# Patient Record
Sex: MALE | Race: WHITE | NOT HISPANIC OR LATINO | URBAN - METROPOLITAN AREA
[De-identification: names, ages, dates, MRNs, and addresses within clinical notes are randomized per-mention and may not be internally consistent; named-entity substitution may affect disease eponyms.]

---

## 2017-09-26 ENCOUNTER — OFFICE VISIT - RIVER FALLS (OUTPATIENT)
Dept: FAMILY MEDICINE | Facility: CLINIC | Age: 67
End: 2017-09-26

## 2017-09-26 ASSESSMENT — MIFFLIN-ST. JEOR: SCORE: 1331.45

## 2017-10-25 ENCOUNTER — RECORDS - HEALTHEAST (OUTPATIENT)
Dept: ADMINISTRATIVE | Facility: OTHER | Age: 67
End: 2017-10-25

## 2017-11-02 ENCOUNTER — OFFICE VISIT - RIVER FALLS (OUTPATIENT)
Dept: FAMILY MEDICINE | Facility: CLINIC | Age: 67
End: 2017-11-02

## 2017-11-02 ASSESSMENT — MIFFLIN-ST. JEOR: SCORE: 1298.79

## 2017-11-03 ENCOUNTER — DOCUMENTATION ONLY (OUTPATIENT)
Dept: VASCULAR SURGERY | Facility: CLINIC | Age: 67
End: 2017-11-03

## 2017-11-03 DIAGNOSIS — Z98.890 HX OF REPAIR OF DISSECTING THORACIC AORTIC ANEURYSM, STANFORD TYPE A: Primary | ICD-10-CM

## 2017-11-03 DIAGNOSIS — Z86.79 HX OF REPAIR OF DISSECTING THORACIC AORTIC ANEURYSM, STANFORD TYPE A: Primary | ICD-10-CM

## 2017-11-03 LAB
CREAT SERPL-MCNC: 2.09 MG/DL (ref 0.7–1.25)
GLUCOSE BLD-MCNC: 99 MG/DL (ref 65–99)

## 2017-11-21 ENCOUNTER — RECORDS - HEALTHEAST (OUTPATIENT)
Dept: ADMINISTRATIVE | Facility: OTHER | Age: 67
End: 2017-11-21

## 2017-11-21 ENCOUNTER — AMBULATORY - HEALTHEAST (OUTPATIENT)
Dept: CARDIOLOGY | Facility: CLINIC | Age: 67
End: 2017-11-21

## 2017-11-24 ENCOUNTER — PRE VISIT (OUTPATIENT)
Dept: CARDIOLOGY | Facility: CLINIC | Age: 67
End: 2017-11-24

## 2017-11-24 DIAGNOSIS — I71.00 AORTIC DISSECTION (H): Primary | ICD-10-CM

## 2017-11-24 NOTE — TELEPHONE ENCOUNTER
EKG : 11/28/17  Prior to appt    Echo : 10/25/17  Frequent PAC's and PVC's.   There appears to be a probable aortic dissection in the abdominal aorta (image 94).   I was notified of this by the sonographer.   She will contact the referring MD or his coverage; clinical correlation, and likely dedicated ultrasound of the abdominal aorta, or CT  scanning will be considered. Please note, this finding appears on the previous study.   Normal left ventricular ejection fraction estimated at 55-60%.   Mild-to-moderate aortic regurgitation.   No pericardial effusion.   Estimated EF: 55-60%    CT chest/abdomen/pelvis : 10/25/17  1.  Aortic dissection extending from distal to the origin of the left subclavian artery into the left common iliac artery.  2.  Additional dissection flap in the innominate artery extends into the right common carotid artery. The cephalad extent of the common carotid involvement appears to be included in the image field of view. Further evaluation by carotid ultrasound is recommended to assess for possible more cephalad involvement of the carotid arteries.  3.  Bovine anatomy of the aortic arch.  4.  Left kidney chronically atrophic with diminished perfusion of the upper pole and interpolar region. Left renal artery arises from the false lumen and is small in size but does appear to opacify.  5.  Cholelithiasis.  6.  Colonic diverticulosis.  7.  Mild thickening of the adrenal gland suggests hyperplasia.

## 2017-11-28 ENCOUNTER — COMMUNICATION - HEALTHEAST (OUTPATIENT)
Dept: TELEHEALTH | Facility: CLINIC | Age: 67
End: 2017-11-28

## 2017-11-28 ENCOUNTER — OFFICE VISIT (OUTPATIENT)
Dept: CARDIOLOGY | Facility: CLINIC | Age: 67
End: 2017-11-28
Attending: INTERNAL MEDICINE
Payer: MEDICARE

## 2017-11-28 VITALS
HEIGHT: 70 IN | DIASTOLIC BLOOD PRESSURE: 66 MMHG | HEART RATE: 92 BPM | SYSTOLIC BLOOD PRESSURE: 170 MMHG | OXYGEN SATURATION: 98 %

## 2017-11-28 DIAGNOSIS — I71.03 DISSECTION OF THORACOABDOMINAL AORTA (H): ICD-10-CM

## 2017-11-28 DIAGNOSIS — I77.71 CAROTID DISSECTION, BILATERAL (H): Primary | ICD-10-CM

## 2017-11-28 DIAGNOSIS — I77.72 DISSECTION OF LEFT ILIAC ARTERY (H): ICD-10-CM

## 2017-11-28 PROBLEM — K57.30 DIVERTICULOSIS OF LARGE INTESTINE: Status: ACTIVE | Noted: 2017-11-28

## 2017-11-28 PROBLEM — I71.010 DISSECTING ANEURYSM OF THORACIC AORTA, STANFORD TYPE A (H): Status: ACTIVE | Noted: 2017-11-28

## 2017-11-28 PROBLEM — N26.1 ATROPHY OF LEFT KIDNEY: Status: ACTIVE | Noted: 2017-11-28

## 2017-11-28 PROCEDURE — 93005 ELECTROCARDIOGRAM TRACING: CPT | Mod: ZF

## 2017-11-28 PROCEDURE — 93010 ELECTROCARDIOGRAM REPORT: CPT | Mod: ZP | Performed by: INTERNAL MEDICINE

## 2017-11-28 PROCEDURE — 99213 OFFICE O/P EST LOW 20 MIN: CPT | Mod: 25,ZF

## 2017-11-28 PROCEDURE — 99205 OFFICE O/P NEW HI 60 MIN: CPT | Mod: ZP | Performed by: INTERNAL MEDICINE

## 2017-11-28 RX ORDER — MULTIVIT-MIN/IRON/FOLIC ACID/K 18-600-40
CAPSULE ORAL
COMMUNITY

## 2017-11-28 RX ORDER — METOPROLOL SUCCINATE 50 MG/1
50 TABLET, EXTENDED RELEASE ORAL
COMMUNITY
Start: 2017-10-27

## 2017-11-28 RX ORDER — SIMVASTATIN 40 MG
40 TABLET ORAL
COMMUNITY

## 2017-11-28 RX ORDER — ASPIRIN 81 MG/1
81 TABLET, CHEWABLE ORAL
COMMUNITY

## 2017-11-28 RX ORDER — TRIAMTERENE AND HYDROCHLOROTHIAZIDE 75; 50 MG/1; MG/1
1 TABLET ORAL
COMMUNITY

## 2017-11-28 ASSESSMENT — PAIN SCALES - GENERAL: PAINLEVEL: NO PAIN (0)

## 2017-11-28 NOTE — PATIENT INSTRUCTIONS
You were seen today in the Cardiovascular Clinic at the [facility].      Cardiology Providers you saw during your visit:  Dr. Curiel    Diagnosis:  Aortic dissection/ carotid dissection/ iliac dissection    Results:  Outside records reviewed.  EKG reviewed in clinic.    Recommendations:   Carotid duplex.  Schedule in the next 1-2 weeks     Lower extremity arterial duplex.  Schedule in next 1-2 weeks.     Continue home blood pressures.  Goal < 130/90    Follow-up:  6 months with Dr. Curile.      For emergencies call [phone].    For any scheduling needs, please call [phone]. Option 1 then option 3    Thank you for your visit today!     Please call if you have any questions or concerns.  Enoc Ayon RN

## 2017-11-28 NOTE — NURSING NOTE
EKG:  Patient given results of EKG done in clinic.  Patient demonstrated understanding of this information and agreed to call with further questions or concerns.  Vascular Testing: Patient given instructions regarding carotid duplex and lower extremity duplex. Discussed purpose, preparation, procedure and when to expect results reported back to the patient. Patient demonstrated understanding of this information and agreed to call with further questions or concerns.  Med Reconcile: Reviewed and verified all current medications with the patient. The updated medication list was printed and given to the patient.  Return Appointment: 6 months with Dr. Curiel.  Patient given instructions regarding scheduling next clinic visit. Patient demonstrated understanding of this information and agreed to call with further questions or concerns.  Patient stated he understood all health information given and agreed to call with further questions or concerns.

## 2017-11-28 NOTE — MR AVS SNAPSHOT
After Visit Summary   11/28/2017    Jose Fierro    MRN: [id number]           Patient Information     Date Of Birth          1950        Visit Information        Provider Department      11/28/2017 11:30 AM Breanne Curiel MD [facility] CARDIOVASCULAR      Today's Diagnoses     Carotid dissection, right    -  1    Aortic dissection (H)        Dissection of thoracoabdominal aorta (H)        Dissection of left iliac artery (H)          Care Instructions    You were seen today in the Cardiovascular Clinic at the [facility].      Cardiology Providers you saw during your visit:  Dr. Curiel    Diagnosis:  Aortic dissection/ carotid dissection/ iliac dissection    Results:  Outside records reviewed.  EKG reviewed in clinic.    Recommendations:   Carotid duplex.  Schedule in the next 1-2 weeks     Lower extremity arterial duplex.  Schedule in next 1-2 weeks.     Continue home blood pressures.  Goal < 130/90    Follow-up:  6 months with Dr. Curiel.      For emergencies call 911.    For any scheduling needs, please call [phone]. Option 1 then option 3    Thank you for your visit today!     Please call if you have any questions or concerns.  Enoc Ayon RN              Follow-ups after your visit        Follow-up notes from your care team     See patient instructions section of the AVS Return in about 6 months (around 5/28/2018) for Routine Visit.      Your next 10 appointments already scheduled     Dec 14, 2017 12:00 PM CST   US CAROTID BILATERAL with [id number] ([facility])    [address] [place] [id number] [phone]           Please bring a list of your medicines (including vitamins, minerals and over-the-counter drugs). Also, tell your doctor about any allergies you may have. Wear comfortable clothes and leave your valuables at home.  You do not need to do anything special to prepare for  your exam.  Please call the Imaging Department at your exam site with any questions.            Dec 14, 2017  1:00 PM CST   US LOWER EXTREMITY ARTERIAL DUPLEX BILATERAL with [email or web address]   [facility] ([facility])    [address] [place], [place] [id number] [phone]           Please bring a list of your medicines (including vitamins, minerals and over-the-counter drugs). Also, tell your doctor about any allergies you may have. Wear comfortable clothes and leave your valuables at home.  You do not need to do anything special to prepare for your exam.  Please call the Imaging Department at your exam site with any questions.            May 29, 2018 12:30 PM CDT   (Arrive by 12:15 PM)   Return Vascular Visit with Breanne Curiel MD   [facility] ([facility])    [address] [place] [id number] [phone]              Future tests that were ordered for you today     Open Future Orders        Priority Expected Expires Ordered    US Lower Extremity Arterial Duplex Bilateral Routine 11/28/2017 11/28/2018 11/28/2017    US Carotid Bilateral Routine 11/28/2017 2/26/2018 11/28/2017            Who to contact     If you have questions or need follow up information about today's clinic visit or your schedule please contact [facility] directly at [phone].  Normal or non-critical lab and imaging results will be communicated to you by MyChart, letter or phone within 4 business days after the clinic has received the results. If you do not hear from us within 7 days, please contact the clinic through MyChart or phone. If you have a critical or abnormal lab result, we will notify you by phone as soon as possible.  Submit refill requests through [employer] or call your pharmacy and they will forward the refill request to us. Please allow 3 business days for your refill to be completed.          Additional  "Information About Your Visit        MyChart Information     [employer] lets you send messages to your doctor, view your test results, renew your prescriptions, schedule appointments and more. To sign up, go to [email or web address] . Click on \"Log in\" on the left side of the screen, which will take you to the Welcome page. Then click on \"Sign up Now\" on the right side of the page.     You will be asked to enter the access code listed below, as well as some personal information. Please follow the directions to create your username and password.     Your access code is: [email or web address]  Expires: 2018 12:45 PM     Your access code will  in 90 days. If you need help or a new code, please call your [place] clinic or [phone].        Care EveryWhere ID     This is your Care EveryWhere ID. This could be used by other organizations to access your [place] medical records  [id number]        Your Vitals Were     Pulse Height Pulse Oximetry             92 1.778 m (5' 10\") 98%          Blood Pressure from Last 3 Encounters:   17 170/66    Weight from Last 3 Encounters:   No data found for Wt              We Performed the Following     EKG 12-lead, tracing only        Primary Care Provider Office Phone # Fax #    Luis Hines [phone]       [facility] [address] [facility] [address]        Equal Access to Services     [facility] : Hadii aad ku hadasho Sojvali, waaxda luqadaha, qaybta kaalmada adeegyada, shelly woodard . So [facility] [phone].    ATENCIÓN: Si habla español, tiene a cowart disposición servicios gratuitos de asistencia lingüística. Shanellame al [phone].    We comply with applicable federal civil rights laws and [place] laws. We do not discriminate on the basis of race, color, national origin, age, disability, sex, sexual orientation, or gender identity.            Thank you!     Thank you for choosing [facility]  " for your care. Our goal is always to provide you with excellent care. Hearing back from our patients is one way we can continue to improve our services. Please take a few minutes to complete the written survey that you may receive in the mail after your visit with us. Thank you!             Your Updated Medication List - Protect others around you: Learn how to safely use, store and throw away your medicines at www.disposemymeds.org.          This list is accurate as of: 11/28/17 12:56 PM.  Always use your most recent med list.                   Brand Name Dispense Instructions for use Diagnosis    aspirin 81 MG chewable tablet      Take 81 mg by mouth        metoprolol 50 MG 24 hr tablet    TOPROL-XL     Take 50 mg by mouth        simvastatin 40 MG tablet    ZOCOR     Take 40 mg by mouth        triamterene-hydrochlorothiazide 75-50 MG per tablet    MAXZIDE     Take 1 tablet by mouth        vitamin D 2000 UNITS Caps

## 2017-11-28 NOTE — PROGRESS NOTES
"VASCULAR CARDIOLOGY INITIAL VISIT    HPI:     This is a pleasant 67 year old male with h/o HTN, Type A dissection c/b tamponade [place] [facility], tobacco use who presents to establish care in Vascular Cardiology. He had presented with acute onset chest/back pain in 2003 prompting EMS to be called. When he was brought to [facility] he was rushed to surgery and had complete arch repair. Since then he has been stable but has little follow up of his aortic disease. He meticulously checks his BP and has been occasionally high especially when he comes to new doctor visits as he tends to get nervous. He does have a cuff at home that he thinks is too big, and his SBP have been reading in the 110s-120s. He adheres to a lifestyle of no heavy lifting but does some walking, and has maintained a healthy weight. For medications he has been on aspirin. His last CT was at [facility] 10/25/2017.   As of recently his activity has been limited by back aches and he feels he cannot walk as much. He denies any claudication. He is still smoking but cutting back. He's been smoking since college in 1976. He denies any aortic disease in his family and he has no kids.     On ROS he denies chest pain, SOB, palpitations, syncope. He did have several recorded episodes of temporary left eye vision problems (seeing gray) that lasted 18 minutes in longest duration. This has been ongoing once a month almost dated back to 2003 after his surgery. On operative report he did have innominant to carotid dissection of the right. He denies any UE or LE weaknesses. ROS negative for high palate, hyperlax skin, lens subluxation. He does report hypermobile joints however. His immediate family had a lot of sudden death, which was attributed to heart attacks however no autopsies were ever performed. When asked about genetic testing, patient reports at this point he would like \"nature take its course\" and has a rosalie in whatever God has in store for him.     PAST MEDICAL " "HISTORY    Aortic dissection  Carotid dissection    CURRENT MEDICATIONS  Current Outpatient Prescriptions   Medication Sig Dispense Refill     metoprolol (TOPROL-XL) 50 MG 24 hr tablet Take 50 mg by mouth       aspirin 81 MG chewable tablet Take 81 mg by mouth       simvastatin (ZOCOR) 40 MG tablet Take 40 mg by mouth       triamterene-hydrochlorothiazide (MAXZIDE) 75-50 MG per tablet Take 1 tablet by mouth         PAST SURGICAL HISTORY:  Type A dissection repair  [place]     ALLERGIES     Allergies   Allergen Reactions     Doxycycline Other (See Comments)     Penicillins Other (See Comments)       VASCULAR FAMILY HISTORY  1st order relative with atherosclerotic PAD: unknown  1st order relative with AAA: unknown  Both grandfathers  of \"heart attack\" (no autopsies)  Mom passed of \"heart attack\" of 70 years no autopsy   All SCD presumed heart attacks  No kids    SOCIAL HISTORY  Social History     Social History     Marital status: Single     Spouse name: N/A     Number of children: N/A     Years of education: N/A     Occupational History     Not on file.     Social History Main Topics     Smoking status: Current Every Day Smoker     Smokeless tobacco: Never Used     Alcohol use No     Drug use: No     Sexual activity: Not on file     Other Topics Concern     Not on file     Social History Narrative     No narrative on file     , no kids    ROS:   Constitutional: No fever, chills, or sweats. No weight gain/loss   ENT: No visual disturbance, ear ache, epistaxis, sore throat  Allergies/Immunologic: Negative  Respiratory: No cough, hemoptysia  Cardiovascular: As per HPI  GI: No nausea, vomiting, hematemesis, melena, or hematochezia  : No urinary frequency, dysuria, or hematuria  Integument: Negative  Psychiatric: Negative  Neuro: Negative  Endocrinology: Negative   Musculoskeletal: Negative  Vascular: No walking impairment, claudication, ischemic rest pain or nonhealing wounds    EXAM:  Ht 1.778 m (5' " "10\")   /66 (BP Location: Right arm, Patient Position: Chair, Cuff Size: Adult Small)  Pulse 92  Ht 1.778 m (5' 10\")  SpO2 98%   Left Arm   In general, the patient is a pleasant male in no apparent distress.    HEENT: NC/AT.  PERRLA.  EOMI.  Sclerae white, not injected.  Nares clear.  Pharynx without erythema or exudate.  Dentition intact.    Neck: No adenopathy.  No thyromegaly. No jugular venous distension.   Heart: Bradycardic. Normal S1, S2, JORDY RUSB. No JVD.  Lungs: CTA.  No ronchi, wheezes, rales.  No dullness to percussion.   Abdomen: Soft, nontender, nondistended. No organomegaly. No AAA.  No bruits.   Extremities: No clubbing, cyanosis, or edema.  No wounds. No varicose veins signs of chronic venous insufficiency.   Vascular:   Left diminished DP, PT  Right 2+ DP  Left 1+ radial, R 2+ radial  BL carotid bruits      Labs:    Pending  Cr wnl from OSH      Procedures:    Echo : 10/25/17  Frequent PAC's and PVC's.   There appears to be a probable aortic dissection in the abdominal aorta (image 94).   I was notified of this by the sonographer.   She will contact the referring MD or his coverage; clinical correlation, and likely dedicated ultrasound of the abdominal aorta, or CT  scanning will be considered. Please note, this finding appears on the previous study.   Normal left ventricular ejection fraction estimated at 55-60%.   Mild-to-moderate aortic regurgitation.   No pericardial effusion.   Estimated EF: 55-60%     CT chest/abdomen/pelvis : 10/25/17  1.  Aortic dissection extending from distal to the origin of the left subclavian artery into the left common iliac artery.  2.  Additional dissection flap in the innominate artery extends into the right common carotid artery. The cephalad extent of the common carotid involvement appears to be included in the image field of view. Further evaluation by carotid ultrasound is recommended to assess for possible more cephalad involvement of the carotid " arteries.  3.  Bovine anatomy of the aortic arch.  4.  Left kidney chronically atrophic with diminished perfusion of the upper pole and interpolar region. Left renal artery arises from the false lumen and is small in size but does appear to opacify.  5.  Cholelithiasis.  6.  Colonic diverticulosis.  7.  Mild thickening of the adrenal gland suggests hyperplasia.    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 He's been having chronic probable TIA symptoms from the right carotid dissection that are always self resolved. He has not yet had his distal carotid imaged since his surgery in 2003.     His family history is concerning for familial TAAD or other syndrome, although he has no syndromic phenotypic features to suggest Marfans or Loeys Azul or Vascular EDS. Patient defers any genetic counseling at this time.    Extensive counseling was had with patient about his symptoms and concern for active dissection flap that may be intermittently blocking blood flow. His longest duration episode was just recently at 19 minutes, thus would like to image his carotids beyond proximal innoinant to carotid visualized on CT via ultrasound. He was advised to seek emergency medical service should his symptoms progress.     For BP management he will stay on metoprolol and HCTZ-triampterene. His BP is high today though the cuff is small, and he is usually controlled at other office visits. Will potentially uptitrate metoprolol, or consider losartan if this is a genetic aortopathy.    For his back and buttock pain will obtain LE duplex studies to visualize the illiac portion of his dissection to assess blood flow limitations. He was counseled on not leading a sedentary life, and maintaining low-moderate intensity activity.    Will follow up with  patient every few months until documented stability of his aortic disease and symptoms. Cardiac evaluation will be guided by symptoms.    Total time spent 90 minutes, of which >50% was spent in face-to-face patient evaluation, reviewing data with patient, and coordination of care.       Breanne Curiel MD MsC  , Aortopathy Clinic  Division of Cardiology  [facility]

## 2017-11-28 NOTE — LETTER
11/28/2017      RE: Jose Fierro  [address] [id number]       Dear Colleague,    Thank you for the opportunity to participate in the care of your patient, Jose Fierro, at the [facility] at [facility]. Please see a copy of my visit note below.    VASCULAR CARDIOLOGY INITIAL VISIT    HPI:     This is a pleasant 67 year old male with h/o HTN, Type A dissection c/b tamponade repaired at [facility], tobacco use who presents to establish care in Vascular Cardiology. He had presented with acute onset chest/back pain in 2003 prompting EMS to be called. When he was brought to [facility] he was rushed to surgery and had complete arch repair. Since then he has been stable but has little follow up of his aortic disease. He meticulously checks his BP and has been occasionally high especially when he comes to new doctor visits as he tends to get nervous. He does have a cuff at home that he thinks is too big, and his SBP have been reading in the 110s-120s. He adheres to a lifestyle of no heavy lifting but does some walking, and has maintained a healthy weight. For medications he has been on aspirin. His last CT was at [place] 10/25/2017.   As of recently his activity has been limited by back aches and he feels he cannot walk as much. He denies any claudication. He is still smoking but cutting back. He's been smoking since college in 1976. He denies any aortic disease in his family and he has no kids.     On ROS he denies chest pain, SOB, palpitations, syncope. He did have several recorded episodes of temporary left eye vision problems (seeing gray) that lasted 18 minutes in longest duration. This has been ongoing once a month almost dated back to 2003 after his surgery. On operative report he did have innominant to carotid dissection of the right. He denies any UE or LE weaknesses. ROS negative for high palate, hyperlax skin, lens subluxation. He does report hypermobile joints  "however. His immediate family had a lot of sudden death, which was attributed to heart attacks however no autopsies were ever performed. When asked about genetic testing, patient reports at this point he would like \"nature take its course\" and has a rosalie in whatever God has in store for him.     PAST MEDICAL HISTORY    Aortic dissection  Carotid dissection    CURRENT MEDICATIONS  Current Outpatient Prescriptions   Medication Sig Dispense Refill     metoprolol (TOPROL-XL) 50 MG 24 hr tablet Take 50 mg by mouth       aspirin 81 MG chewable tablet Take 81 mg by mouth       simvastatin (ZOCOR) 40 MG tablet Take 40 mg by mouth       triamterene-hydrochlorothiazide (MAXZIDE) 75-50 MG per tablet Take 1 tablet by mouth         PAST SURGICAL HISTORY:  Type A dissection repair  [place]     ALLERGIES     Allergies   Allergen Reactions     Doxycycline Other (See Comments)     Penicillins Other (See Comments)       VASCULAR FAMILY HISTORY  1st order relative with atherosclerotic PAD: unknown  1st order relative with AAA: unknown  Both grandfathers  of \"heart attack\" (no autopsies)  Mom passed of \"heart attack\" of 70 years no autopsy   All SCD presumed heart attacks  No kids    SOCIAL HISTORY  Social History     Social History     Marital status: Single     Spouse name: N/A     Number of children: N/A     Years of education: N/A     Occupational History     Not on file.     Social History Main Topics     Smoking status: Current Every Day Smoker     Smokeless tobacco: Never Used     Alcohol use No     Drug use: No     Sexual activity: Not on file     Other Topics Concern     Not on file     Social History Narrative     No narrative on file     , no kids    ROS:   Constitutional: No fever, chills, or sweats. No weight gain/loss   ENT: No visual disturbance, ear ache, epistaxis, sore throat  Allergies/Immunologic: Negative  Respiratory: No cough, hemoptysia  Cardiovascular: As per HPI  GI: No nausea, vomiting, " "hematemesis, melena, or hematochezia  : No urinary frequency, dysuria, or hematuria  Integument: Negative  Psychiatric: Negative  Neuro: Negative  Endocrinology: Negative   Musculoskeletal: Negative  Vascular: No walking impairment, claudication, ischemic rest pain or nonhealing wounds    EXAM:  Ht 1.778 m (5' 10\")   /66 (BP Location: Right arm, Patient Position: Chair, Cuff Size: Adult Small)  Pulse 92  Ht 1.778 m (5' 10\")  SpO2 98%   Left Arm   In general, the patient is a pleasant male in no apparent distress.    HEENT: NC/AT.  PERRLA.  EOMI.  Sclerae white, not injected.  Nares clear.  Pharynx without erythema or exudate.  Dentition intact.    Neck: No adenopathy.  No thyromegaly. No jugular venous distension.   Heart: Bradycardic. Normal S1, S2, JORDY RUSB. No JVD.  Lungs: CTA.  No ronchi, wheezes, rales.  No dullness to percussion.   Abdomen: Soft, nontender, nondistended. No organomegaly. No AAA.  No bruits.   Extremities: No clubbing, cyanosis, or edema.  No wounds. No varicose veins signs of chronic venous insufficiency.   Vascular:   Left diminished DP, PT  Right 2+ DP  Left 1+ radial, R 2+ radial  BL carotid bruits      Labs:    Pending  Cr wnl from OSH      Procedures:    Echo : 10/25/17  Frequent PAC's and PVC's.   There appears to be a probable aortic dissection in the abdominal aorta (image 94).   I was notified of this by the sonographer.   She will contact the referring MD or his coverage; clinical correlation, and likely dedicated ultrasound of the abdominal aorta, or CT  scanning will be considered. Please note, this finding appears on the previous study.   Normal left ventricular ejection fraction estimated at 55-60%.   Mild-to-moderate aortic regurgitation.   No pericardial effusion.   Estimated EF: 55-60%     CT chest/abdomen/pelvis : 10/25/17  1.  Aortic dissection extending from distal to the origin of the left subclavian artery into the left common iliac " artery.  2.  Additional dissection flap in the innominate artery extends into the right common carotid artery. The cephalad extent of the common carotid involvement appears to be included in the image field of view. Further evaluation by carotid ultrasound is recommended to assess for possible more cephalad involvement of the carotid arteries.  3.  Bovine anatomy of the aortic arch.  4.  Left kidney chronically atrophic with diminished perfusion of the upper pole and interpolar region. Left renal artery arises from the false lumen and is small in size but does appear to opacify.  5.  Cholelithiasis.  6.  Colonic diverticulosis.  7.  Mild thickening of the adrenal gland suggests hyperplasia.    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 He's been having chronic probable TIA symptoms from the right carotid dissection that are always self resolved. He has not yet had his distal carotid imaged since his surgery in 2003.     His family history is concerning for familial TAAD or other syndrome, although he has no syndromic phenotypic features to suggest Marfans or Loeys Azul or Vascular EDS. Patient defers any genetic counseling at this time.    Extensive counseling was had with patient about his symptoms and concern for active dissection flap that may be intermittently blocking blood flow. His longest duration episode was just recently at 19 minutes, thus would like to image his carotids beyond proximal innoinant to carotid visualized on CT via ultrasound. He was advised to seek emergency medical service should his symptoms progress.     For BP management he will stay on metoprolol and HCTZ-triampterene. His BP is high today though the cuff is small, and he is usually controlled at other office visits. Will potentially uptitrate  metoprolol, or consider losartan if this is a genetic aortopathy.    For his back and buttock pain will obtain LE duplex studies to visualize the illiac portion of his dissection to assess blood flow limitations. He was counseled on not leading a sedentary life, and maintaining low-moderate intensity activity.    Will follow up with patient every few months until documented stability of his aortic disease and symptoms. Cardiac evaluation will be guided by symptoms.    Total time spent 90 minutes, of which >50% was spent in face-to-face patient evaluation, reviewing data with patient, and coordination of care.       Breanne Curiel MD MsC  , Aortopathy Clinic  Division of Cardiology  [facility]

## 2017-11-28 NOTE — NURSING NOTE
Chief Complaint   Patient presents with     New Patient     66 y/o male for initial evaluation of type b aortic dissection.  EKG     Vitals were taken and medications were reconciled. EKG was performed.    OCTAVIANO Campbell  11:35 AM

## 2017-11-29 ENCOUNTER — OFFICE VISIT - HEALTHEAST (OUTPATIENT)
Dept: CARDIOLOGY | Facility: TELEHEALTH | Age: 67
End: 2017-11-29

## 2017-11-29 DIAGNOSIS — I48.91 ATRIAL FIBRILLATION WITH RVR (H): ICD-10-CM

## 2017-11-29 DIAGNOSIS — I10 ESSENTIAL HYPERTENSION: ICD-10-CM

## 2017-11-29 DIAGNOSIS — I73.9 PERIPHERAL ARTERY DISEASE (H): ICD-10-CM

## 2017-11-29 DIAGNOSIS — I71.019 DISSECTION OF THORACIC AORTA (H): ICD-10-CM

## 2017-11-29 LAB — INTERPRETATION ECG - MUSE: NORMAL

## 2017-11-29 ASSESSMENT — MIFFLIN-ST. JEOR: SCORE: 1329.17

## 2017-12-14 ENCOUNTER — RADIANT APPOINTMENT (OUTPATIENT)
Dept: ULTRASOUND IMAGING | Facility: CLINIC | Age: 67
End: 2017-12-14
Attending: INTERNAL MEDICINE
Payer: MEDICARE

## 2017-12-14 ENCOUNTER — HOSPITAL ENCOUNTER (OUTPATIENT)
Dept: CARDIOLOGY | Facility: CLINIC | Age: 67
Discharge: HOME | End: 2017-12-14
Attending: INTERNAL MEDICINE

## 2017-12-14 DIAGNOSIS — I77.72 DISSECTION OF LEFT ILIAC ARTERY (H): ICD-10-CM

## 2017-12-14 DIAGNOSIS — I48.91 ATRIAL FIBRILLATION WITH RVR (H): ICD-10-CM

## 2017-12-14 DIAGNOSIS — I77.71 CAROTID DISSECTION, BILATERAL (H): ICD-10-CM

## 2017-12-29 ENCOUNTER — CARE COORDINATION (OUTPATIENT)
Dept: CARDIOLOGY | Facility: CLINIC | Age: 67
End: 2017-12-29

## 2017-12-29 NOTE — PROGRESS NOTES
Patient called into triage for his recent test result.  I read his carotid US, CANDIS, and LE US results which all read as not having any significant blockages.  Patient stated that Enoc Ayon did not need to call him back unless there were any recommendations from the doctor.

## 2018-01-08 ENCOUNTER — RECORDS - HEALTHEAST (OUTPATIENT)
Dept: ADMINISTRATIVE | Facility: OTHER | Age: 68
End: 2018-01-08

## 2018-01-08 ENCOUNTER — COMMUNICATION - HEALTHEAST (OUTPATIENT)
Dept: CARDIOLOGY | Facility: CLINIC | Age: 68
End: 2018-01-08

## 2018-01-10 ENCOUNTER — OFFICE VISIT - HEALTHEAST (OUTPATIENT)
Dept: CARDIOLOGY | Facility: TELEHEALTH | Age: 68
End: 2018-01-10

## 2018-01-10 DIAGNOSIS — I48.91 ATRIAL FIBRILLATION WITH RVR (H): ICD-10-CM

## 2018-01-10 DIAGNOSIS — I10 ESSENTIAL HYPERTENSION: ICD-10-CM

## 2018-01-10 DIAGNOSIS — I71.019 DISSECTION OF THORACIC AORTA (H): ICD-10-CM

## 2018-01-10 DIAGNOSIS — I73.9 PERIPHERAL ARTERY DISEASE (H): ICD-10-CM

## 2018-01-10 DIAGNOSIS — I71.00 AORTIC DISSECTION (H): ICD-10-CM

## 2018-01-10 ASSESSMENT — MIFFLIN-ST. JEOR: SCORE: 1313.3

## 2018-01-24 ENCOUNTER — OFFICE VISIT - HEALTHEAST (OUTPATIENT)
Dept: CARDIOLOGY | Facility: CLINIC | Age: 68
End: 2018-01-24

## 2018-01-24 DIAGNOSIS — I10 ESSENTIAL HYPERTENSION: ICD-10-CM

## 2018-01-24 DIAGNOSIS — I48.0 PAROXYSMAL ATRIAL FIBRILLATION (H): ICD-10-CM

## 2018-01-24 DIAGNOSIS — R00.1 SINUS BRADYCARDIA: ICD-10-CM

## 2018-01-24 ASSESSMENT — MIFFLIN-ST. JEOR: SCORE: 1310.58

## 2018-01-25 LAB
ATRIAL RATE - MUSE: 46 BPM
DIASTOLIC BLOOD PRESSURE - MUSE: NORMAL MMHG
INTERPRETATION ECG - MUSE: NORMAL
P AXIS - MUSE: 112 DEGREES
PR INTERVAL - MUSE: 158 MS
QRS DURATION - MUSE: 84 MS
QT - MUSE: 434 MS
QTC - MUSE: 434 MS
R AXIS - MUSE: 50 DEGREES
SYSTOLIC BLOOD PRESSURE - MUSE: NORMAL MMHG
T AXIS - MUSE: -77 DEGREES
VENTRICULAR RATE- MUSE: 60 BPM

## 2018-05-08 ENCOUNTER — COMMUNICATION - HEALTHEAST (OUTPATIENT)
Dept: ADMINISTRATIVE | Facility: CLINIC | Age: 68
End: 2018-05-08

## 2018-05-23 ENCOUNTER — COMMUNICATION - HEALTHEAST (OUTPATIENT)
Dept: ADMINISTRATIVE | Facility: CLINIC | Age: 68
End: 2018-05-23

## 2018-10-11 ENCOUNTER — OFFICE VISIT - RIVER FALLS (OUTPATIENT)
Dept: FAMILY MEDICINE | Facility: CLINIC | Age: 68
End: 2018-10-11

## 2018-10-11 ASSESSMENT — MIFFLIN-ST. JEOR: SCORE: 1374.09

## 2018-10-12 LAB
CHOLEST SERPL-MCNC: 139 MG/DL
CHOLEST/HDLC SERPL: 2.9 {RATIO}
CREAT SERPL-MCNC: 2.06 MG/DL (ref 0.7–1.25)
GLUCOSE BLD-MCNC: 93 MG/DL (ref 65–99)
HBA1C MFR BLD: 5.1 %
HDLC SERPL-MCNC: 48 MG/DL
LDLC SERPL CALC-MCNC: 76 MG/DL
NONHDLC SERPL-MCNC: 91 MG/DL
TRIGL SERPL-MCNC: 72 MG/DL

## 2018-10-30 ENCOUNTER — OFFICE VISIT - RIVER FALLS (OUTPATIENT)
Dept: FAMILY MEDICINE | Facility: CLINIC | Age: 68
End: 2018-10-30

## 2018-10-30 ASSESSMENT — MIFFLIN-ST. JEOR: SCORE: 1362.29

## 2018-12-17 ENCOUNTER — AMBULATORY - RIVER FALLS (OUTPATIENT)
Dept: FAMILY MEDICINE | Facility: CLINIC | Age: 68
End: 2018-12-17

## 2018-12-18 ENCOUNTER — AMBULATORY - RIVER FALLS (OUTPATIENT)
Dept: FAMILY MEDICINE | Facility: CLINIC | Age: 68
End: 2018-12-18

## 2019-01-11 ENCOUNTER — COMMUNICATION - HEALTHEAST (OUTPATIENT)
Dept: CARDIOLOGY | Facility: TELEHEALTH | Age: 69
End: 2019-01-11

## 2019-01-11 DIAGNOSIS — I48.91 ATRIAL FIBRILLATION WITH RVR (H): ICD-10-CM

## 2019-01-11 DIAGNOSIS — I71.019 DISSECTION OF THORACIC AORTA (H): ICD-10-CM

## 2019-01-11 DIAGNOSIS — I10 ESSENTIAL HYPERTENSION: ICD-10-CM

## 2019-01-22 ENCOUNTER — OFFICE VISIT - RIVER FALLS (OUTPATIENT)
Dept: FAMILY MEDICINE | Facility: CLINIC | Age: 69
End: 2019-01-22

## 2019-02-27 ENCOUNTER — OFFICE VISIT - RIVER FALLS (OUTPATIENT)
Dept: FAMILY MEDICINE | Facility: CLINIC | Age: 69
End: 2019-02-27

## 2019-02-27 ASSESSMENT — MIFFLIN-ST. JEOR: SCORE: 1353.22

## 2019-02-28 LAB
BUN SERPL-MCNC: 41 MG/DL (ref 7–25)
BUN/CREAT RATIO - HISTORICAL: 19 (ref 6–22)
CALCIUM SERPL-MCNC: 9.7 MG/DL (ref 8.6–10.3)
CHLORIDE BLD-SCNC: 114 MMOL/L (ref 98–110)
CO2 SERPL-SCNC: 22 MMOL/L (ref 20–32)
CREAT SERPL-MCNC: 2.11 MG/DL (ref 0.7–1.25)
EGFRCR SERPLBLD CKD-EPI 2021: 32 ML/MIN/1.73M2
ERYTHROCYTE [DISTWIDTH] IN BLOOD BY AUTOMATED COUNT: 13.7 % (ref 11–15)
GLUCOSE BLD-MCNC: 87 MG/DL (ref 65–99)
HCT VFR BLD AUTO: 41.9 % (ref 38.5–50)
HGB BLD-MCNC: 14.2 GM/DL (ref 13.2–17.1)
MCH RBC QN AUTO: 29.8 PG (ref 27–33)
MCHC RBC AUTO-ENTMCNC: 33.9 GM/DL (ref 32–36)
MCV RBC AUTO: 88 FL (ref 80–100)
PLATELET # BLD AUTO: 216 10*3/UL (ref 140–400)
PMV BLD: 10.2 FL (ref 7.5–12.5)
POTASSIUM BLD-SCNC: 4.1 MMOL/L (ref 3.5–5.3)
RBC # BLD AUTO: 4.76 10*6/UL (ref 4.2–5.8)
SODIUM SERPL-SCNC: 140 MMOL/L (ref 135–146)
T4 FREE SERPL-MCNC: 1 NG/DL (ref 0.8–1.8)
TSH SERPL DL<=0.005 MIU/L-ACNC: 4.58 MIU/L (ref 0.4–4.5)
WBC # BLD AUTO: 9.2 10*3/UL (ref 3.8–10.8)

## 2019-04-02 ENCOUNTER — AMBULATORY - RIVER FALLS (OUTPATIENT)
Dept: FAMILY MEDICINE | Facility: CLINIC | Age: 69
End: 2019-04-02

## 2019-04-03 LAB
BUN SERPL-MCNC: 61 MG/DL (ref 7–25)
BUN/CREAT RATIO - HISTORICAL: 23 (ref 6–22)
CALCIUM SERPL-MCNC: 8.9 MG/DL (ref 8.6–10.3)
CHLORIDE BLD-SCNC: 109 MMOL/L (ref 98–110)
CO2 SERPL-SCNC: 18 MMOL/L (ref 20–32)
CREAT SERPL-MCNC: 2.64 MG/DL (ref 0.7–1.25)
EGFRCR SERPLBLD CKD-EPI 2021: 24 ML/MIN/1.73M2
GLUCOSE BLD-MCNC: 148 MG/DL (ref 65–99)
POTASSIUM BLD-SCNC: 4 MMOL/L (ref 3.5–5.3)
SODIUM SERPL-SCNC: 136 MMOL/L (ref 135–146)

## 2019-04-15 ENCOUNTER — COMMUNICATION - RIVER FALLS (OUTPATIENT)
Dept: FAMILY MEDICINE | Facility: CLINIC | Age: 69
End: 2019-04-15

## 2019-04-15 ENCOUNTER — OFFICE VISIT - RIVER FALLS (OUTPATIENT)
Dept: FAMILY MEDICINE | Facility: CLINIC | Age: 69
End: 2019-04-15

## 2019-04-15 LAB
ANION GAP SERPL CALCULATED.3IONS-SCNC: 13 MMOL/L (ref 5–18)
BNP SERPL-MCNC: 453 PG/ML
BUN SERPL-MCNC: 47 MG/DL (ref 8–25)
BUN/CREAT RATIO - HISTORICAL: 21 (ref 10–20)
CALCIUM SERPL-MCNC: 9.6 MG/DL (ref 8.5–10.5)
CHLORIDE BLD-SCNC: 113 MMOL/L (ref 98–110)
CO2 SERPL-SCNC: 14 MMOL/L (ref 21–31)
CREAT SERPL-MCNC: 2.19 MG/DL (ref 0.72–1.25)
ERYTHROCYTE [DISTWIDTH] IN BLOOD BY AUTOMATED COUNT: 14.7 % (ref 11.5–15.5)
GFR ESTIMATE EXT - HISTORICAL: 30
GLUCOSE BLD-MCNC: 103 MG/DL (ref 65–100)
HCT VFR BLD AUTO: 39 % (ref 37–53)
HGB BLD-MCNC: 13.1 G/DL (ref 13.5–17.5)
MCH RBC QN AUTO: 29.3 PG (ref 26–34)
MCHC RBC AUTO-ENTMCNC: 33.6 G/DL (ref 32–36)
MCV RBC AUTO: 87 FL (ref 80–100)
PLATELET # BLD AUTO: 235 10*3/UL (ref 140–440)
PMV BLD: 9.2 FL (ref 6.5–11)
POTASSIUM BLD-SCNC: 4.1 MMOL/L (ref 3.5–5)
RBC # BLD AUTO: 4.47 10*6/UL (ref 4.3–5.9)
SODIUM SERPL-SCNC: 140 MMOL/L (ref 135–145)
WBC # BLD AUTO: 9.8 10*3/UL (ref 4.5–11)

## 2019-04-15 ASSESSMENT — MIFFLIN-ST. JEOR: SCORE: 1316.03

## 2019-04-16 ENCOUNTER — AMBULATORY - HEALTHEAST (OUTPATIENT)
Dept: CARDIOLOGY | Facility: CLINIC | Age: 69
End: 2019-04-16

## 2019-04-16 ENCOUNTER — RECORDS - HEALTHEAST (OUTPATIENT)
Dept: ADMINISTRATIVE | Facility: OTHER | Age: 69
End: 2019-04-16

## 2019-04-17 ENCOUNTER — OFFICE VISIT - HEALTHEAST (OUTPATIENT)
Dept: CARDIOLOGY | Facility: TELEHEALTH | Age: 69
End: 2019-04-17

## 2019-04-17 DIAGNOSIS — N18.30 STAGE 3 CHRONIC KIDNEY DISEASE (H): ICD-10-CM

## 2019-04-17 DIAGNOSIS — I71.03 DISSECTION OF THORACOABDOMINAL AORTA (H): ICD-10-CM

## 2019-04-17 DIAGNOSIS — Z72.0 TOBACCO ABUSE: ICD-10-CM

## 2019-04-17 DIAGNOSIS — I10 ESSENTIAL HYPERTENSION: ICD-10-CM

## 2019-04-17 DIAGNOSIS — I48.0 PAROXYSMAL ATRIAL FIBRILLATION (H): ICD-10-CM

## 2019-04-17 ASSESSMENT — MIFFLIN-ST. JEOR: SCORE: 1317.84

## 2019-04-18 ENCOUNTER — COMMUNICATION - HEALTHEAST (OUTPATIENT)
Dept: CARDIOLOGY | Facility: CLINIC | Age: 69
End: 2019-04-18

## 2019-04-18 ENCOUNTER — RECORDS - HEALTHEAST (OUTPATIENT)
Dept: ADMINISTRATIVE | Facility: OTHER | Age: 69
End: 2019-04-18

## 2019-07-02 ENCOUNTER — AMBULATORY - RIVER FALLS (OUTPATIENT)
Dept: FAMILY MEDICINE | Facility: CLINIC | Age: 69
End: 2019-07-02

## 2019-07-03 ENCOUNTER — COMMUNICATION - RIVER FALLS (OUTPATIENT)
Dept: FAMILY MEDICINE | Facility: CLINIC | Age: 69
End: 2019-07-03

## 2019-07-03 LAB
BUN SERPL-MCNC: 42 MG/DL (ref 7–25)
BUN/CREAT RATIO - HISTORICAL: 18 (ref 6–22)
CALCIUM SERPL-MCNC: 8.9 MG/DL (ref 8.6–10.3)
CHLORIDE BLD-SCNC: 114 MMOL/L (ref 98–110)
CO2 SERPL-SCNC: 17 MMOL/L (ref 20–32)
CREAT SERPL-MCNC: 2.32 MG/DL (ref 0.7–1.25)
EGFRCR SERPLBLD CKD-EPI 2021: 28 ML/MIN/1.73M2
GLUCOSE BLD-MCNC: 87 MG/DL (ref 65–99)
POTASSIUM BLD-SCNC: 4.2 MMOL/L (ref 3.5–5.3)
SODIUM SERPL-SCNC: 138 MMOL/L (ref 135–146)

## 2019-08-28 ENCOUNTER — COMMUNICATION - HEALTHEAST (OUTPATIENT)
Dept: ADMINISTRATIVE | Facility: CLINIC | Age: 69
End: 2019-08-28

## 2019-10-02 ENCOUNTER — OFFICE VISIT - RIVER FALLS (OUTPATIENT)
Dept: FAMILY MEDICINE | Facility: CLINIC | Age: 69
End: 2019-10-02

## 2019-10-02 ASSESSMENT — MIFFLIN-ST. JEOR: SCORE: 1306.95

## 2019-10-03 ENCOUNTER — COMMUNICATION - RIVER FALLS (OUTPATIENT)
Dept: FAMILY MEDICINE | Facility: CLINIC | Age: 69
End: 2019-10-03

## 2019-10-03 LAB
ALBUMIN UR-MCNC: NEGATIVE G/DL
APPEARANCE UR: CLEAR
BILIRUB UR QL STRIP: NEGATIVE
BUN SERPL-MCNC: 46 MG/DL (ref 7–25)
BUN/CREAT RATIO - HISTORICAL: 21 (ref 6–22)
CALCIUM SERPL-MCNC: 9.1 MG/DL (ref 8.6–10.3)
CALCIUM SERPL-MCNC: 9.1 MG/DL (ref 8.6–10.3)
CHLORIDE BLD-SCNC: 112 MMOL/L (ref 98–110)
CHOLEST SERPL-MCNC: 132 MG/DL
CHOLEST/HDLC SERPL: 2.8 {RATIO}
CO2 SERPL-SCNC: 17 MMOL/L (ref 20–32)
COLOR UR AUTO: YELLOW
CREAT SERPL-MCNC: 2.22 MG/DL (ref 0.7–1.25)
EGFRCR SERPLBLD CKD-EPI 2021: 30 ML/MIN/1.73M2
ERYTHROCYTE [DISTWIDTH] IN BLOOD BY AUTOMATED COUNT: 13.6 % (ref 11–15)
GLUCOSE BLD-MCNC: 80 MG/DL (ref 65–99)
GLUCOSE UR STRIP-MCNC: NEGATIVE MG/DL
HCT VFR BLD AUTO: 40.4 % (ref 38.5–50)
HDLC SERPL-MCNC: 48 MG/DL
HGB BLD-MCNC: 13.2 GM/DL (ref 13.2–17.1)
HGB UR QL STRIP: NEGATIVE
KETONES UR STRIP-MCNC: NEGATIVE MG/DL
LDLC SERPL CALC-MCNC: 67 MG/DL
LEUKOCYTE ESTERASE UR QL STRIP: NEGATIVE
MCH RBC QN AUTO: 28.6 PG (ref 27–33)
MCHC RBC AUTO-ENTMCNC: 32.7 GM/DL (ref 32–36)
MCV RBC AUTO: 87.6 FL (ref 80–100)
NITRATE UR QL: NEGATIVE
NONHDLC SERPL-MCNC: 84 MG/DL
PH UR STRIP: 6 [PH] (ref 5–8)
PLATELET # BLD AUTO: 231 10*3/UL (ref 140–400)
PMV BLD: 9.6 FL (ref 7.5–12.5)
POTASSIUM BLD-SCNC: 4.1 MMOL/L (ref 3.5–5.3)
PTH-INTACT SERPL-MCNC: 24 PG/ML (ref 14–64)
RBC # BLD AUTO: 4.61 10*6/UL (ref 4.2–5.8)
SODIUM SERPL-SCNC: 137 MMOL/L (ref 135–146)
SP GR UR STRIP: 1.01 (ref 1–1.03)
T4 FREE SERPL-MCNC: 0.9 NG/DL (ref 0.8–1.8)
TRIGL SERPL-MCNC: 83 MG/DL
TSH SERPL DL<=0.005 MIU/L-ACNC: 4.15 MIU/L (ref 0.4–4.5)
WBC # BLD AUTO: 9.3 10*3/UL (ref 3.8–10.8)

## 2019-10-16 ENCOUNTER — OFFICE VISIT - RIVER FALLS (OUTPATIENT)
Dept: FAMILY MEDICINE | Facility: CLINIC | Age: 69
End: 2019-10-16

## 2019-10-16 ASSESSMENT — MIFFLIN-ST. JEOR: SCORE: 1308.77

## 2019-10-17 ENCOUNTER — RECORDS - HEALTHEAST (OUTPATIENT)
Dept: ADMINISTRATIVE | Facility: OTHER | Age: 69
End: 2019-10-17

## 2019-11-12 ENCOUNTER — AMBULATORY - HEALTHEAST (OUTPATIENT)
Dept: CARDIOLOGY | Facility: CLINIC | Age: 69
End: 2019-11-12

## 2019-11-20 ENCOUNTER — OFFICE VISIT - HEALTHEAST (OUTPATIENT)
Dept: CARDIOLOGY | Facility: TELEHEALTH | Age: 69
End: 2019-11-20

## 2019-11-20 DIAGNOSIS — I71.03 DISSECTION OF THORACOABDOMINAL AORTA (H): ICD-10-CM

## 2019-11-20 DIAGNOSIS — I10 ESSENTIAL HYPERTENSION: ICD-10-CM

## 2019-11-20 DIAGNOSIS — I71.019 DISSECTION OF THORACIC AORTA (H): ICD-10-CM

## 2019-11-20 DIAGNOSIS — I48.0 PAROXYSMAL ATRIAL FIBRILLATION (H): ICD-10-CM

## 2019-11-20 DIAGNOSIS — I73.9 PERIPHERAL ARTERY DISEASE (H): ICD-10-CM

## 2019-11-20 DIAGNOSIS — I48.91 ATRIAL FIBRILLATION WITH RVR (H): ICD-10-CM

## 2019-11-20 DIAGNOSIS — N18.30 STAGE 3 CHRONIC KIDNEY DISEASE (H): ICD-10-CM

## 2019-11-20 ASSESSMENT — MIFFLIN-ST. JEOR: SCORE: 1308.77

## 2019-11-22 ENCOUNTER — AMBULATORY - HEALTHEAST (OUTPATIENT)
Dept: CARDIOLOGY | Facility: CLINIC | Age: 69
End: 2019-11-22

## 2019-11-22 DIAGNOSIS — I48.0 PAROXYSMAL ATRIAL FIBRILLATION (H): ICD-10-CM

## 2019-11-22 DIAGNOSIS — I10 ESSENTIAL HYPERTENSION: ICD-10-CM

## 2020-01-01 ENCOUNTER — COMMUNICATION - RIVER FALLS (OUTPATIENT)
Dept: FAMILY MEDICINE | Facility: CLINIC | Age: 70
End: 2020-01-01

## 2020-01-01 ENCOUNTER — AMBULATORY - RIVER FALLS (OUTPATIENT)
Dept: FAMILY MEDICINE | Facility: CLINIC | Age: 70
End: 2020-01-01

## 2020-01-01 ENCOUNTER — OFFICE VISIT - RIVER FALLS (OUTPATIENT)
Dept: FAMILY MEDICINE | Facility: CLINIC | Age: 70
End: 2020-01-01

## 2020-01-01 LAB
BUN SERPL-MCNC: 54 MG/DL (ref 7–25)
BUN/CREAT RATIO - HISTORICAL: 26 (ref 6–22)
CALCIUM SERPL-MCNC: 9.1 MG/DL (ref 8.6–10.3)
CHLORIDE BLD-SCNC: 116 MMOL/L (ref 98–110)
CHOLEST SERPL-MCNC: 130 MG/DL
CHOLEST/HDLC SERPL: 3.1 {RATIO}
CO2 SERPL-SCNC: 15 MMOL/L (ref 20–32)
CREAT SERPL-MCNC: 2.11 MG/DL (ref 0.7–1.25)
EGFRCR SERPLBLD CKD-EPI 2021: 31 ML/MIN/1.73M2
ERYTHROCYTE [DISTWIDTH] IN BLOOD BY AUTOMATED COUNT: 13.5 % (ref 11–15)
GLUCOSE BLD-MCNC: 83 MG/DL (ref 65–99)
HCT VFR BLD AUTO: 38.9 % (ref 38.5–50)
HDLC SERPL-MCNC: 42 MG/DL
HGB BLD-MCNC: 12.7 GM/DL (ref 13.2–17.1)
LDLC SERPL CALC-MCNC: 70 MG/DL
MCH RBC QN AUTO: 30.3 PG (ref 27–33)
MCHC RBC AUTO-ENTMCNC: 32.6 GM/DL (ref 32–36)
MCV RBC AUTO: 92.8 FL (ref 80–100)
NONHDLC SERPL-MCNC: 88 MG/DL
PLATELET # BLD AUTO: 198 10*3/UL (ref 140–400)
PMV BLD: 10.2 FL (ref 7.5–12.5)
POTASSIUM BLD-SCNC: 5.3 MMOL/L (ref 3.5–5.3)
RBC # BLD AUTO: 4.19 10*6/UL (ref 4.2–5.8)
SODIUM SERPL-SCNC: 138 MMOL/L (ref 135–146)
TRIGL SERPL-MCNC: 96 MG/DL
WBC # BLD AUTO: 10.9 10*3/UL (ref 3.8–10.8)

## 2020-01-08 ENCOUNTER — HOSPITAL ENCOUNTER (OUTPATIENT)
Dept: CARDIOLOGY | Facility: HOSPITAL | Age: 70
Discharge: HOME | End: 2020-01-08
Attending: INTERNAL MEDICINE

## 2020-01-08 DIAGNOSIS — I10 ESSENTIAL HYPERTENSION: ICD-10-CM

## 2020-01-08 DIAGNOSIS — I48.0 PAROXYSMAL ATRIAL FIBRILLATION (H): ICD-10-CM

## 2020-01-08 ASSESSMENT — MIFFLIN-ST. JEOR: SCORE: 1292.89

## 2020-01-09 LAB
AORTIC ROOT: 3.4 CM
AR DECEL SLOPE: 2580 MM/S2
AR DECEL SLOPE: 2810 MM/S2
AR DECEL SLOPE: 2850 MM/S2
AR DECEL SLOPE: 3150 MM/S2
AR PEAK VELOCITY: 434 CM/S
AR PEAK VELOCITY: 450 CM/S
AR PEAK VELOCITY: 453 CM/S
AR PEAK VELOCITY: 476 CM/S
AV REGURGITATION PRESSURE HALF TIME: 469 MS
BSA FOR ECHO PROCEDURE: 1.63 M2
CV BLOOD PRESSURE: ABNORMAL MMHG
CV ECHO HEIGHT: 68 IN
CV ECHO WEIGHT: 122 LBS
DOP CALC LVOT AREA: 3.14 CM2
DOP CALC LVOT DIAMETER: 2 CM
DOP CALC LVOT PEAK VEL: 90 CM/S
DOP CALC LVOT STROKE VOLUME: 64.7 CM3
DOP CALCLVOT PEAK VEL VTI: 20.6 CM
EJECTION FRACTION: 56 % (ref 55–75)
FRACTIONAL SHORTENING: 27.2 % (ref 28–44)
INTERVENTRICULAR SEPTUM IN END DIASTOLE: 0.82 CM (ref 0.6–1)
IVS/PW RATIO: 0.9
LA AREA 1: 27 CM2
LA AREA 2: 23 CM2
LEFT ATRIUM LENGTH: 6 CM
LEFT ATRIUM SIZE: 4.7 CM
LEFT ATRIUM TO AORTIC ROOT RATIO: 1.38 NO UNITS
LEFT ATRIUM VOLUME INDEX: 54 ML/M2
LEFT ATRIUM VOLUME: 88 ML
LEFT VENTRICLE DIASTOLIC VOLUME INDEX: 50.7 CM3/M2 (ref 34–74)
LEFT VENTRICLE DIASTOLIC VOLUME: 82.7 CM3 (ref 62–150)
LEFT VENTRICLE MASS INDEX: 69.4 G/M2
LEFT VENTRICLE SYSTOLIC VOLUME INDEX: 22.2 CM3/M2 (ref 11–31)
LEFT VENTRICLE SYSTOLIC VOLUME: 36.2 CM3 (ref 21–61)
LEFT VENTRICULAR INTERNAL DIMENSION IN DIASTOLE: 4.19 CM (ref 4.2–5.8)
LEFT VENTRICULAR INTERNAL DIMENSION IN SYSTOLE: 3.05 CM (ref 2.5–4)
LEFT VENTRICULAR MASS: 113.1 G
LEFT VENTRICULAR OUTFLOW TRACT MEAN GRADIENT: 2 MMHG
LEFT VENTRICULAR OUTFLOW TRACT MEAN VELOCITY: 59.4 CM/S
LEFT VENTRICULAR OUTFLOW TRACT PEAK GRADIENT: 3 MMHG
LEFT VENTRICULAR POSTERIOR WALL IN END DIASTOLE: 0.92 CM (ref 0.6–1)
LV STROKE VOLUME INDEX: 39.7 ML/M2
MITRAL REGURGITANT VELOCITY TIME INTEGRAL: 166 CM
MR FLOW: 15 CM3
MR MEAN GRADIENT: 77 MMHG
MR MEAN GRADIENT: 77 MMHG
MR MEAN VELOCITY: 414 CM/S
MR MEAN VELOCITY: 414 CM/S
MR PEAK GRADIENT: 117.1 MMHG
MR PISA EROA: 0.1 CM2
MR PISA RADIUS: 0.6 CM
MR PISA VN NYQUIST: 31 CM/S
MV AVERAGE E/E' RATIO: 8.1 CM/S
MV E'TISSUE VEL-LAT: 16 CM/S
MV E'TISSUE VEL-MED: 10.1 CM/S
MV LATERAL E/E' RATIO: 6.6
MV MEDIAL E/E' RATIO: 10.5
MV PEAK E VELOCITY: 106 CM/S
MV REGURGITANT VOLUME: 21.5 CC
NUC REST DIASTOLIC VOLUME INDEX: 1952 LBS
NUC REST SYSTOLIC VOLUME INDEX: 68 IN
PISA MR PEAK VEL: 541 CM/S
TRICUSPID REGURGITATION PEAK PRESSURE GRADIENT: 41.7 MMHG
TRICUSPID VALVE PEAK REGURGITANT VELOCITY: 323 CM/S

## 2020-01-16 ENCOUNTER — AMBULATORY - RIVER FALLS (OUTPATIENT)
Dept: FAMILY MEDICINE | Facility: CLINIC | Age: 70
End: 2020-01-16

## 2020-01-17 ENCOUNTER — AMBULATORY - RIVER FALLS (OUTPATIENT)
Dept: FAMILY MEDICINE | Facility: CLINIC | Age: 70
End: 2020-01-17

## 2020-01-22 ENCOUNTER — OFFICE VISIT - HEALTHEAST (OUTPATIENT)
Dept: CARDIOLOGY | Facility: TELEHEALTH | Age: 70
End: 2020-01-22

## 2020-01-22 DIAGNOSIS — I48.0 PAROXYSMAL ATRIAL FIBRILLATION (H): ICD-10-CM

## 2020-01-22 DIAGNOSIS — I10 ESSENTIAL HYPERTENSION: ICD-10-CM

## 2020-01-22 DIAGNOSIS — R93.1 ABNORMAL ECHOCARDIOGRAM: ICD-10-CM

## 2020-01-22 DIAGNOSIS — I71.03 DISSECTION OF THORACOABDOMINAL AORTA (H): ICD-10-CM

## 2020-01-29 ENCOUNTER — AMBULATORY - HEALTHEAST (OUTPATIENT)
Dept: CARDIOLOGY | Facility: CLINIC | Age: 70
End: 2020-01-29

## 2020-01-31 ENCOUNTER — HOSPITAL ENCOUNTER (OUTPATIENT)
Dept: NUCLEAR MEDICINE | Facility: CLINIC | Age: 70
Discharge: HOME | End: 2020-01-31
Attending: INTERNAL MEDICINE

## 2020-01-31 ENCOUNTER — HOSPITAL ENCOUNTER (OUTPATIENT)
Dept: CARDIOLOGY | Facility: CLINIC | Age: 70
Discharge: HOME | End: 2020-01-31
Attending: INTERNAL MEDICINE

## 2020-01-31 DIAGNOSIS — I48.0 PAROXYSMAL ATRIAL FIBRILLATION (H): ICD-10-CM

## 2020-01-31 DIAGNOSIS — I10 ESSENTIAL HYPERTENSION: ICD-10-CM

## 2020-01-31 DIAGNOSIS — R93.1 ABNORMAL ECHOCARDIOGRAM: ICD-10-CM

## 2020-01-31 DIAGNOSIS — I71.03 DISSECTION OF THORACOABDOMINAL AORTA (H): ICD-10-CM

## 2020-01-31 LAB
CV STRESS CURRENT BP HE: NORMAL
CV STRESS CURRENT HR HE: 100
CV STRESS CURRENT HR HE: 101
CV STRESS CURRENT HR HE: 101
CV STRESS CURRENT HR HE: 115
CV STRESS CURRENT HR HE: 124
CV STRESS CURRENT HR HE: 132
CV STRESS CURRENT HR HE: 134
CV STRESS CURRENT HR HE: 135
CV STRESS CURRENT HR HE: 135
CV STRESS CURRENT HR HE: 136
CV STRESS CURRENT HR HE: 137
CV STRESS CURRENT HR HE: 138
CV STRESS CURRENT HR HE: 97
CV STRESS DEVIATION TIME HE: NORMAL
CV STRESS ECHO PERCENT HR HE: NORMAL
CV STRESS EXERCISE STAGE HE: NORMAL
CV STRESS FINAL RESTING BP HE: NORMAL
CV STRESS FINAL RESTING HR HE: 135
CV STRESS MAX HR HE: 138
CV STRESS MAX TREADMILL GRADE HE: 0
CV STRESS MAX TREADMILL SPEED HE: 0
CV STRESS PEAK DIA BP HE: NORMAL
CV STRESS PEAK SYS BP HE: NORMAL
CV STRESS PHASE HE: NORMAL
CV STRESS PROTOCOL HE: NORMAL
CV STRESS RESTING PT POSITION HE: NORMAL
CV STRESS RESTING PT POSITION HE: NORMAL
CV STRESS ST DEVIATION AMOUNT HE: NORMAL
CV STRESS ST DEVIATION ELEVATION HE: NORMAL
CV STRESS ST EVELATION AMOUNT HE: NORMAL
CV STRESS TEST TYPE HE: NORMAL
CV STRESS TOTAL STAGE TIME MIN 1 HE: NORMAL
NUC STRESS EJECTION FRACTION: 61 %
RATE PRESSURE PRODUCT: NORMAL
STRESS ECHO BASELINE DIASTOLIC HE: 65
STRESS ECHO BASELINE HR: 85
STRESS ECHO BASELINE SYSTOLIC BP: 153
STRESS ECHO CALCULATED PERCENT HR: 90 %
STRESS ECHO LAST STRESS DIASTOLIC BP: 68
STRESS ECHO LAST STRESS HR: 136
STRESS ECHO LAST STRESS SYSTOLIC BP: 120
STRESS ECHO TARGET HR: 153

## 2020-02-05 ENCOUNTER — COMMUNICATION - HEALTHEAST (OUTPATIENT)
Dept: CARDIOLOGY | Facility: CLINIC | Age: 70
End: 2020-02-05

## 2020-02-05 ENCOUNTER — OFFICE VISIT - HEALTHEAST (OUTPATIENT)
Dept: CARDIOLOGY | Facility: CLINIC | Age: 70
End: 2020-02-05

## 2020-02-05 DIAGNOSIS — R93.1 ABNORMAL ECHOCARDIOGRAM: ICD-10-CM

## 2020-02-05 DIAGNOSIS — I71.019 DISSECTION OF THORACIC AORTA (H): ICD-10-CM

## 2020-02-05 DIAGNOSIS — I48.0 PAROXYSMAL ATRIAL FIBRILLATION (H): ICD-10-CM

## 2020-02-05 DIAGNOSIS — I71.03 DISSECTION OF THORACOABDOMINAL AORTA (H): ICD-10-CM

## 2020-02-05 DIAGNOSIS — I10 ESSENTIAL HYPERTENSION: ICD-10-CM

## 2020-02-05 DIAGNOSIS — N18.30 STAGE 3 CHRONIC KIDNEY DISEASE (H): ICD-10-CM

## 2020-02-05 DIAGNOSIS — I73.9 PERIPHERAL ARTERY DISEASE (H): ICD-10-CM

## 2020-02-05 DIAGNOSIS — I48.91 ATRIAL FIBRILLATION WITH RVR (H): ICD-10-CM

## 2020-02-05 LAB
ALBUMIN SERPL-MCNC: 3.8 G/DL (ref 3.5–5)
ALP SERPL-CCNC: 94 U/L (ref 45–120)
ALT SERPL W P-5'-P-CCNC: <9 U/L (ref 0–45)
ANION GAP SERPL CALCULATED.3IONS-SCNC: 12 MMOL/L (ref 5–18)
AST SERPL W P-5'-P-CCNC: 15 U/L (ref 0–40)
BILIRUB SERPL-MCNC: 0.4 MG/DL (ref 0–1)
BUN SERPL-MCNC: 39 MG/DL (ref 8–22)
CALCIUM SERPL-MCNC: 10 MG/DL (ref 8.5–10.5)
CHLORIDE BLD-SCNC: 110 MMOL/L (ref 98–107)
CO2 SERPL-SCNC: 17 MMOL/L (ref 22–31)
CREAT SERPL-MCNC: 2.17 MG/DL (ref 0.7–1.3)
ERYTHROCYTE [DISTWIDTH] IN BLOOD BY AUTOMATED COUNT: 14.5 % (ref 11–14.5)
GFR SERPL CREATININE-BSD FRML MDRD: 30 ML/MIN/1.73M2
GLUCOSE BLD-MCNC: 95 MG/DL (ref 70–125)
HCT VFR BLD AUTO: 45.5 % (ref 40–54)
HGB BLD-MCNC: 14.5 G/DL (ref 14–18)
MCH RBC QN AUTO: 29.3 PG (ref 27–34)
MCHC RBC AUTO-ENTMCNC: 31.9 G/DL (ref 32–36)
MCV RBC AUTO: 92 FL (ref 80–100)
PLATELET # BLD AUTO: 236 THOU/UL (ref 140–440)
PMV BLD AUTO: 9.3 FL (ref 8.5–12.5)
POTASSIUM BLD-SCNC: 5 MMOL/L (ref 3.5–5)
PROT SERPL-MCNC: 7.5 G/DL (ref 6–8)
RBC # BLD AUTO: 4.95 MILL/UL (ref 4.4–6.2)
SODIUM SERPL-SCNC: 139 MMOL/L (ref 136–145)
WBC: 9.4 THOU/UL (ref 4–11)

## 2020-02-05 ASSESSMENT — MIFFLIN-ST. JEOR: SCORE: 1288.35

## 2020-02-10 ENCOUNTER — COMMUNICATION - HEALTHEAST (OUTPATIENT)
Dept: CARDIOLOGY | Facility: CLINIC | Age: 70
End: 2020-02-10

## 2020-03-04 ENCOUNTER — OFFICE VISIT - HEALTHEAST (OUTPATIENT)
Dept: CARDIOLOGY | Facility: TELEHEALTH | Age: 70
End: 2020-03-04

## 2020-03-04 DIAGNOSIS — I71.03 DISSECTION OF THORACOABDOMINAL AORTA (H): ICD-10-CM

## 2020-03-04 DIAGNOSIS — N18.30 STAGE 3 CHRONIC KIDNEY DISEASE (H): ICD-10-CM

## 2020-03-04 DIAGNOSIS — I48.0 PAROXYSMAL ATRIAL FIBRILLATION (H): ICD-10-CM

## 2020-03-04 DIAGNOSIS — I10 ESSENTIAL HYPERTENSION: ICD-10-CM

## 2020-03-04 DIAGNOSIS — I25.10 CORONARY ARTERY DISEASE INVOLVING NATIVE CORONARY ARTERY OF NATIVE HEART WITHOUT ANGINA PECTORIS: ICD-10-CM

## 2020-03-04 DIAGNOSIS — I73.9 PERIPHERAL ARTERY DISEASE (H): ICD-10-CM

## 2020-03-04 ASSESSMENT — MIFFLIN-ST. JEOR: SCORE: 1311.03

## 2020-04-20 ENCOUNTER — COMMUNICATION - HEALTHEAST (OUTPATIENT)
Dept: CARDIOLOGY | Facility: CLINIC | Age: 70
End: 2020-04-20

## 2020-04-20 DIAGNOSIS — I71.019 DISSECTION OF THORACIC AORTA (H): ICD-10-CM

## 2020-04-20 DIAGNOSIS — I10 ESSENTIAL HYPERTENSION: ICD-10-CM

## 2020-04-20 DIAGNOSIS — I48.91 ATRIAL FIBRILLATION WITH RVR (H): ICD-10-CM

## 2020-05-12 ENCOUNTER — OFFICE VISIT - RIVER FALLS (OUTPATIENT)
Dept: FAMILY MEDICINE | Facility: CLINIC | Age: 70
End: 2020-05-12

## 2020-05-12 LAB
ALBUMIN SERPL-MCNC: 3.8 G/DL
ALP SERPL-CCNC: 94 UNIT/L
ALT SERPL W P-5'-P-CCNC: 9 UNIT/L
AST SERPL W P-5'-P-CCNC: 15 UNIT/L
BILIRUB SERPL-MCNC: 0.4 MG/DL
BUN SERPL-MCNC: 39 MG/DL
CHLORIDE BLD-SCNC: 110 MEQ/L
CO2 SERPL-SCNC: 17 MEQ/L
CREAT SERPL-MCNC: 2.17 MG/DL
ERYTHROCYTE [DISTWIDTH] IN BLOOD BY AUTOMATED COUNT: 14.5 %
GLUCOSE BLD-MCNC: 95 MG/DL
HCT VFR BLD AUTO: 45.5 %
HGB BLD-MCNC: 14.5 G/DL
MCH RBC QN AUTO: 29.3 PG
MCHC RBC AUTO-ENTMCNC: 31.9 GM/DL
MCV RBC AUTO: 92 FL
PLATELET # BLD AUTO: 236 X10
PMV BLD: 9.3 FL
POTASSIUM BLD-SCNC: 5 MEQ/L
PROT SERPL-MCNC: 7.5 GM/DL
RBC # BLD AUTO: 4.95 X10
SODIUM SERPL-SCNC: 139 MEQ/L
WBC # BLD AUTO: 9.4 X10

## 2021-01-01 ENCOUNTER — AMBULATORY - RIVER FALLS (OUTPATIENT)
Dept: FAMILY MEDICINE | Facility: CLINIC | Age: 71
End: 2021-01-01

## 2021-01-01 VITALS — BODY MASS INDEX: 18.13 KG/M2 | HEIGHT: 69 IN | WEIGHT: 122.4 LBS

## 2021-01-01 VITALS
RESPIRATION RATE: 20 BRPM | SYSTOLIC BLOOD PRESSURE: 136 MMHG | HEIGHT: 68 IN | DIASTOLIC BLOOD PRESSURE: 58 MMHG | WEIGHT: 126 LBS | BODY MASS INDEX: 19.1 KG/M2 | HEART RATE: 107 BPM

## 2021-01-01 VITALS — BODY MASS INDEX: 18.37 KG/M2 | WEIGHT: 124 LBS | HEIGHT: 69 IN

## 2021-01-01 VITALS
HEART RATE: 80 BPM | DIASTOLIC BLOOD PRESSURE: 68 MMHG | BODY MASS INDEX: 18.55 KG/M2 | WEIGHT: 122 LBS | SYSTOLIC BLOOD PRESSURE: 116 MMHG | RESPIRATION RATE: 16 BRPM

## 2021-01-01 VITALS — WEIGHT: 122 LBS | BODY MASS INDEX: 18.49 KG/M2 | HEIGHT: 68 IN

## 2021-01-01 VITALS
HEIGHT: 68 IN | HEART RATE: 96 BPM | SYSTOLIC BLOOD PRESSURE: 120 MMHG | DIASTOLIC BLOOD PRESSURE: 70 MMHG | WEIGHT: 121 LBS | RESPIRATION RATE: 14 BRPM | BODY MASS INDEX: 18.34 KG/M2

## 2021-01-01 VITALS
SYSTOLIC BLOOD PRESSURE: 130 MMHG | HEIGHT: 69 IN | HEART RATE: 120 BPM | WEIGHT: 122 LBS | BODY MASS INDEX: 18.07 KG/M2 | DIASTOLIC BLOOD PRESSURE: 80 MMHG

## 2021-01-01 VITALS — WEIGHT: 123 LBS | HEIGHT: 70 IN | BODY MASS INDEX: 17.61 KG/M2

## 2021-01-01 VITALS — HEIGHT: 69 IN | WEIGHT: 123 LBS | BODY MASS INDEX: 18.22 KG/M2

## 2021-05-27 NOTE — TELEPHONE ENCOUNTER
----- Message from Cory Partida MD sent at 4/17/2019  5:45 PM CDT -----  Aysha,    Could you call Dr. Hines's nurse in [place] and ask if it would be okay if this patient saw Dr. Fabricio Rodriguez in [place] for nephrology consult including advice on imaging his thoracic and abdominal aorta as a follow-up to aortic dissection.    Thanks,    Cory

## 2021-05-28 NOTE — TELEPHONE ENCOUNTER
Spoke with Shanell Hines's office. She will help patient arrange visit with nephrologist, Dr. Fabricio Rodriguez. -linhb

## 2021-06-05 NOTE — TELEPHONE ENCOUNTER
----- Message from Cory Partida MD sent at 2/7/2020  8:52 AM CST -----  Aysha,    I spoke with the patient's nephrologist, Dr. Fabricio Rodriguez in [place].  He thinks it is reasonable to go ahead with coronary angiogram.  Mr. Gilliam is not enthusiastic about this but I think it would be important given the abnormal nature of his stress test and his atrial flutter.    If he is agreeable, would do a core without possible as we would probably bring him back for percutaneous intervention so that we can give him a low contrast load.  He would have the renal protocol and could come in a couple of hours early for saline infusion and 150 cc an hour.    He also has a chronic dissection of his descending thoracic aorta.  Could I speak with the procedure MD if the patient agrees to go ahead.    Thanks,    Cory

## 2021-06-05 NOTE — PROGRESS NOTES
Patient here for a follow-up visit regarding atrial flutter, atrial fibrillation.  His echocardiogram suggested an apical aneurysm and prior echocardiograms did not suggest wall motion abnormalities.  He has had no chest discomfort, and also denies unusual shortness of breath, palpitations, syncope or near syncopal episodes.    He had a Holter monitor which was taken off on Friday and the results are not yet available through [facility].    We will try to get the results through [facility] and also I think he should have a pharmacologic nuclear study given the apical wall motion normality.  We will order that as well.  I would like to see him back in 2 to 3 weeks.    Thanks,    Cory Partida

## 2021-06-06 NOTE — PROGRESS NOTES
Here to discuss the results of his recent pharmacologic nuclear study which did show evidence of myocardial ischemia both in the distribution of the LAD and the right coronary artery.  Neither were large areas.  Overall resting ejection fraction is normal.    Patient is not eager for coronary angiography and does not have any angina and feels like he is symptom-free.  We talked about the potential benefits and risks, particularly given that he has renal insufficiency.  He would be extremely adverse to even considering dialysis.  Because he is feeling well he would prefer to not undergo angiography and follow-up but if he develops symptoms he will call us immediately.  I did tell him that I would recommend coronary angiography but understand his feelings and will support him and be available if he has any new symptoms.  We will plan on seeing him in about 4 months.    Thank you for allowing us to participate in his care.

## 2021-06-15 NOTE — PROGRESS NOTES
[facility]--Electrophysiology    Assessment/Plan:      Problem List Items Addressed This Visit     Essential hypertension    Paroxysmal atrial fibrillation - Primary    Relevant Orders    ECG 12 lead with MUSE (Completed)    Sinus bradycardia        1.  Paroxysmal atrial fibrillation with RVR: Documented recurrence during his hospitalization in October.  Subsequent event monitor showed mild sinus bradycardia and chronotropic incompetence as well as moderately frequent PVCs, but no atrial fibrillation.  He does not appear to be symptomatic with either his current degree of sinus node dysfunction or atrial fibrillation.  We had a lengthy discussion of the physiology and natural progression of atrial fibrillation as well as treatment options of rate control versus rhythm control depending upon the presence of symptoms.  He was instructed to check his pulse daily and with possible symptoms, note any associated symptoms, and keep a log of episodes.  He was also instructed to call if he has recurrence of atrial fibrillation.    He was reassured that atrial fibrillation is not life-threatening, but does carry an increased risk for stroke.  He has a [id number] score of 5 for age 65-74, hypertension, vascular disease, and TIA.  We discussed current guidelines recommending long-term anticoagulation.  However, he is also at increased risk for bleeding due to chronic aortic dissection.  We discussed the alternative of left atrial appendage occlusion with the watchman device and short-term OAC.  We discussed the and postprocedural ALBA, the implant procedure, risks, expected recovery, and follow-up.  He was also given some information to review at home and was encouraged to consider this option.     2.  Hypertension: Blood pressure at target today and consistently all within target per home readings.    Follow-up in 3 months.    Subjective:      Jose Fierro, a very pleasant 67-year-old gentleman, comes in today for  "EP evaluation of atrial fibrillation.  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  He was noted to have atrial fibrillation in the postoperative period.  He was briefly hospitalized at the end of October 2017 for atrial fibrillation with rapid ventricular response that spontaneously converted back to sinus rhythm with IV diltiazem.  Ventricular rates were reportedly in the 120s-150s.  This is his first documented recurrence of atrial fibrillation with which he was asymptomatic.    He has had TIA symptoms with temporary left eye vision alteration lasting <20 minutes that had been occurring almost monthly dating back to 2003, though he reports that he has not had any since starting simvastatin.  This is thought to likely be from the right carotid dissection flap that may be intermittently blocking blood flow.  Carotid ultrasound and CT shows significant stenosis, but right innominate artery dissection flap extending into the distal aspect of the common carotid artery.  He is now being followed at the aorta center at the [facility]; medical management has been recommended at this point.    Mj states that he feels \"fine\".  He states that he has not noted any decrease in his energy level or activity tolerance.  He does have some dyspnea on exertion after climbing 3 flights of stairs, but states that he covers quickly.  Of note, he lives on the third floor in a building without an elevator.  He denies chest discomfort, palpitations, shortness of breath, paroxysmal nocturnal dyspnea, orthopnea, lightheadedness, dizziness, pre-syncope, or syncope.    Medical, surgical, family, social history, and medications were reviewed and updated as necessary.    Patient Active Problem List   Diagnosis     Aortic dissection     Stage 3 chronic " kidney disease     Essential hypertension     Peripheral artery disease     Hyperlipidemia     Paroxysmal atrial fibrillation     Sinus bradycardia       Past Medical History:   Diagnosis Date     Aortic dissection 2003    aortic root reconstruction at [facility]     HTN (hypertension)      Hyperlipidemia      Peripheral artery disease      Stage 3 chronic kidney disease      TIA (transient ischemic attack)        Past Surgical History:   Procedure Laterality Date     reconstruction ascending aorta   2003    [facility]; initial repair and taken back to OR for 5 more hours of surgery       Allergies   Allergen Reactions     Doxycycline Other (See Comments)     Penicillins Other (See Comments)       Current Outpatient Prescriptions   Medication Sig Dispense Refill     aspirin 81 mg chewable tablet Chew 81 mg daily.       cholecalciferol, vitamin D3, 1,000 unit tablet Take 1,000 Units by mouth daily.       metoprolol succinate (TOPROL-XL) 50 MG 24 hr tablet Take 1 tablet (50 mg total) by mouth daily. 90 tablet 3     NIFEdipine (ADALAT CC) 90 MG 24 hr tablet Take 90 mg by mouth daily.       simvastatin (ZOCOR) 40 MG tablet Take 40 mg by mouth at bedtime.       triamterene-hydrochlorothiazide (MAXZIDE) 75-50 mg per tablet Take 1 tablet by mouth daily.       No current facility-administered medications for this visit.        Family History   Problem Relation Age of Onset     Sudden death Mother      No Medical Problems Sister      Acute Myocardial Infarction Paternal Grandfather        Social History   Substance Use Topics     Smoking status: Current Every Day Smoker     Smokeless tobacco: Never Used      Comment: small cigars/     Alcohol use No      Comment: quit years ago        Review of Systems:   General: WNL  Eyes: WNL  Ears/Nose/Throat: WNL  Lungs: WNL  Heart: WNL  Stomach: WNL  Bladder: WNL  Muscle/Joints: WNL  Skin: WNL  Nervous System: WNL  Mental Health: WNL     Blood: WNL      Objective:   "  /50 (Patient Site: Left Arm, Patient Position: Sitting, Cuff Size: Adult Regular)  Pulse (!) 48  Resp 16  Ht 5' 9\" (1.753 m)  Wt 122 lb 6.4 oz (55.5 kg)  BMI 18.08 kg/m2    Physical Exam  General Appearance:  Alert, well-appearing, in no acute distress.     HEENT:  Atraumatic, normocephalic; no scleral icterus, EOM intact, PERRL; the mucous membranes were pink and moist; no cervical bruits or obvious thyromegaly.   Chest: Chest symmetric, spine straight.     Lungs:   Respirations unlabored; the lungs are clear to auscultation.   Cardiovascular:   Auscultation reveals normal first and second heart sounds with no murmurs, rubs, or gallops.  Regular rate and rhythm. No JVD.  Radial and posterior tibial pulses are intact.    Abdomen:  Soft, nontender, nondistended, bowel sounds present.     Extremities: No cyanosis, clubbing, or edema.   Musculoskeletal:  Moves all extremities.     Skin: No xanthelasma.   Neurologic: Mood and affect are appropriate. Oriented to person, place, time, and situation.       Cardiographics (personally reviewed)  EC2018 shows sinus bradycardia at 60 bpm with frequent PVCs    Event monitor worn 2017 through 2017 shows sinus bradycardia 43-60 bpm with evidence for chronotropic incompetence.  Moderately frequent PVCs and ventricular couplets, rare PACs.  No atrial fibrillation.    Echocardiogram: Done 10/25/2017 at [facility]  Normal left ventricular size and systolic performance, LVEF 55-60%.  LV wall thickness mild to moderately increased.  No regional wall motion abnormalities.  Normal RV size and systolic function.  Moderate left atrial dilatation.  Mild right atrial dilatation.  Tricuspid aortic valve with mild to moderate AR.  Mildly thickened mitral valve with mild MR.  Appears to be dissection in the abdominal aorta.    CT chest/abdomen/pelvis : 10/25/17  1.  Aortic dissection extending from distal to the origin of the left subclavian artery into the left common " iliac artery.  2.  Additional dissection flap in the innominate artery extends into the right common carotid artery. The cephalad extent of the common carotid involvement appears to be included in the image field of view. Further evaluation by carotid ultrasound is recommended to assess for possible more cephalad involvement of the carotid arteries.  3.  Bovine anatomy of the aortic arch.  4.  Left kidney chronically atrophic with diminished perfusion of the upper pole and interpolar region. Left renal artery arises from the false lumen and is small in size but does appear to opacify.  5.  Cholelithiasis.  6.  Colonic diverticulosis.  7.  Mild thickening of the adrenal gland suggests hyperplasia.      Lab Review   Lab Results   Component Value Date    CREATININE 1.79 (H) 10/26/2017    BUN 32 (H) 10/26/2017     10/26/2017    K 3.5 10/26/2017     (H) 10/26/2017    CO2 17 (L) 10/26/2017     Lab Results   Component Value Date    WBC 7.1 10/26/2017    HGB 12.6 (L) 10/26/2017    HCT 37.8 (L) 10/26/2017    MCV 88 10/26/2017     10/26/2017         Greater than 50 minutes were spent face to face in this visit with more than 50% of the time discussing diagnoses as listed above, counseling, and coordination of care.      Alma Crockett, [facility], Electrophysiology  [phone]    This note has been dictated using voice recognition software. Any grammatical, typographical, or context distortions are unintentional and inherent to the software.

## 2021-06-16 NOTE — TELEPHONE ENCOUNTER
Telephone Encounter by Aysha Helm RN at 3/5/2020 10:43 AM     Author: Aysha Helm RN Service: -- Author Type: Registered Nurse    Filed: 3/5/2020 10:43 AM Encounter Date: 2/10/2020 Status: Signed    : Aysha Helm RN (Registered Nurse)

## 2021-06-19 NOTE — LETTER
Letter by Cory Partida MD at      Author: Cory Partida MD Service: -- Author Type: --    Filed:  Encounter Date: 8/28/2019 Status: (Other)         Jose Fierro  [address] [address]      August 28, 2019      Dear Jose,    This letter is to remind you that you will be due for your follow up appointment with Dr. Cory Partida  . To help ensure you are in the best health possible, a regular follow-up with your cardiologist is essential.     Please call our Patient Scheduling Line at [phone] to schedule your appointment at your earliest convenience.  If you have recently scheduled an appointment, please disregard this letter.    We look forward to seeing you again. As always, we are available at the number  above for any questions or concerns you may have.      Sincerely,     The Physicians and Staff of [facility]

## 2021-06-25 NOTE — PROGRESS NOTES
Progress Notes by Cory Partida MD at 11/29/2017  1:30 PM     Author: Cory Partida MD Service: -- Author Type: Physician    Filed: 11/29/2017  2:27 PM Encounter Date: 11/29/2017 Status: Signed    : Cory Partida MD (Physician)           Click to link to [facility] HEART [facility] NOTE    Thank you, Dr. Hines, for asking us to see Jose Fierro at the [facility].      Assessment/Recommendations   Patient with a complicated vascular history who had repair of a acute dissection in [place] in 2003.  He presented with hemodynamic collapse and needed to go back to the operating room because of a tear in the pulmonary artery.  He has done reasonably well but recently was readmitted for atrial fibrillation and concern about repeat flap.  This was imaged, he saw cardiovascular surgery and it was felt to be chronic in nature.  His atrial fibrillation resolved in the hospital and he was sent home on an aspirin without other anticoagulants.    He does have a high chads 2 vascular score of 3 getting a point for age, a point for hypertension, a point for vascular disease.  He would be a candidate for anticoagulation unless he is having rare fleeting atrial fibrillation.  I do not have a sense of how much atrial fibrillation he has so we will do a two-week ACT monitor to evaluate him in this regard.    Recent echocardiogram suggested mild to moderate aortic insufficiency and this should be followed on annual basis.    He did visit with a cardiologist at the [facility] who ordered carotid ultrasounds and peripheral vascular studies.  He is wondering if he can have those closer to home and I told him I would investigate this a bit and probably call the cardiologist to see if we could coordinate.  If he needs any further invasive procedures or repeat surgery on his vascular tree, I think the [facility] would be an excellent place for him to  have this and therefore to be followed for his vascular disease there as well.  We will get back to him with that information.    Blood pressure is not optimal today but his blood pressure readings at home are excellent although the cuff he has is a bit big.  I have asked him to get a new cuff and start taking his blood pressure and recording them so we can evaluate them.  We would want to be very aggressive with his blood pressure medications because of his vascular disease.    Thank you for allowing us to participate in his care.             History of Present Illness    Mr. Jose Fierro is a 67 y.o. male with known history of aortic dissection which was repaired in [place] in .  He has been followed since that time and has had moderate and mild to moderate aortic insufficiency.  He reports that he had atrial fibrillation postoperatively in  and thinks he may have had it since then but he does not feel palpitations.  He was in for routine echocardiogram where they thought that they saw a dissection flap in the abdominal aorta and he was actually in atrial fibrillation at the time so he was transported from [place] to [facility] where he was admitted to the ICU, his heart rate was slowed, he converted to sinus rhythm, and he had a cardiovascular surgery consult who felt that the vascular changes were old.  He was set up for follow-up with the vascular center at the [facility] which he did yesterday.  They ordered peripheral vascular studies and a carotid ultrasound would like to see him back in several months.  He is wondering if he can get some of these tests closer to home.    Denies orthopnea, paroxysmal nocturnal dyspnea, peripheral edema, syncope or near syncope and denies palpitations.  He is not diabetic but has been treated for hypertension and his mother  of a myocardial infarction at age 70.  She  suddenly.  He does smoke 4 small cigars a day and is weaning  himself down and hopes to be off of all tobacco products in December.  Her graph he has no history of rheumatic fever, cerebrovascular accident or TIA.    He grew up in [place] and moved to [place] because his sister lives here he has no family in [place].  He moved here after his surgery in 2003.  Is not .  Her graph    .     Physical Examination Review of Systems   Vitals:    11/29/17 1337   BP: 150/54   Pulse: 68   Resp: 16     Body mass index is 17.65 kg/(m^2).  Wt Readings from Last 3 Encounters:   11/29/17 123 lb (55.8 kg)   10/25/17 124 lb (56.2 kg)     General Appearance:   Alert, cooperative and in no acute distress.   ENT/Mouth: Oral mucuos membranes pink and moist .      EYES:  No scleral icterus. No Xanthelasma.    Neck: JVP normal. No Hepatojugular reflux. Thyroid not visualizedly   Chest/Lungs:   Lungs are clear to auscultation, equal chest wall expansion    Cardiovascular:   S1, S2 without murmur ,clicks or rubs. Brachial, radial and posterior tibial pulses are intact and symetric.  Loud left carotid bruits noted   Abdomen:  Nontender. BS+.       Extremities: No cyanosis, clubbing or edema   Skin: no xanthelasma, warm.    Psych: Appropriate affect.   Neurologic: normal gait, normal  bilateral, no tremors        General: WNL  Eyes: WNL  Ears/Nose/Throat: WNL  Lungs: WNL  Heart: Irregular Heartbeat  Stomach: WNL  Bladder: WNL  Muscle/Joints: WNL  Skin: WNL  Nervous System: Daytime Sleepiness  Mental Health: WNL     Blood: WNL     Medical History  Surgical History Family History Social History   Past Medical History:   Diagnosis Date   ? Aortic dissection 2003    aortic root reconstruction at [facility] in [facility]   ? HTN (hypertension)    ? Hyperlipidemia    ? Peripheral artery disease    ? Stage 3 chronic kidney disease     Past Surgical History:   Procedure Laterality Date   ? reconstruction ascending aorta   2003    [facility]; initial repair and taken back to OR for 5 more  hours of surgery    No family history on file. Social History     Social History   ? Marital status: Single     Spouse name: N/A   ? Number of children: 0   ? Years of education: N/A     Occupational History   ? disabled since 2003      worked at [employer]     Social History Main Topics   ? Smoking status: Current Every Day Smoker   ? Smokeless tobacco: Not on file      Comment: small cigars/   ? Alcohol use No      Comment: quit years ago    ? Drug use: No   ? Sexual activity: Not on file     Other Topics Concern   ? Not on file     Social History Narrative    From [place] had surgery at [facility] for aortic dissection and prolonged recovery then moved to be close to sister in [place] (2003), redo in 2014. Still smokes small cigars. Quit ETOH yrs ago.  years ago. No kids worked at [employer] before disability. Enjoys sports/music/miniature circus hobbyist.           Medications  Allergies   Current Outpatient Prescriptions   Medication Sig Dispense Refill   ? aspirin 81 mg chewable tablet Chew 81 mg daily.     ? cholecalciferol, vitamin D3, 1,000 unit tablet Take 1,000 Units by mouth daily.     ? metoprolol succinate (TOPROL-XL) 50 MG 24 hr tablet Take 1 tablet (50 mg total) by mouth daily. 30 tablet 2   ? simvastatin (ZOCOR) 40 MG tablet Take 40 mg by mouth at bedtime.     ? triamterene-hydrochlorothiazide (MAXZIDE) 75-50 mg per tablet Take 1 tablet by mouth daily.     ? NIFEdipine (ADALAT CC) 90 MG 24 hr tablet Take 90 mg by mouth daily.       No current facility-administered medications for this visit.       Allergies   Allergen Reactions   ? Doxycycline Other (See Comments)   ? Penicillins Other (See Comments)         Lab Results    Chemistry/lipid CBC Cardiac Enzymes/BNP/TSH/INR   Lab Results   Component Value Date    CREATININE 1.79 (H) 10/26/2017    BUN 32 (H) 10/26/2017    K 3.5 10/26/2017     10/26/2017     (H) 10/26/2017    CO2 17 (L) 10/26/2017    Lab Results   Component Value Date    WBC  7.1 10/26/2017    HGB 12.6 (L) 10/26/2017    HCT 37.8 (L) 10/26/2017    MCV 88 10/26/2017     10/26/2017    Lab Results   Component Value Date    INR 1.13 (H) 10/25/2017

## 2021-06-26 NOTE — PROGRESS NOTES
Progress Notes by Cory Partida MD at 1/10/2018  2:10 PM     Author: Cory Partida MD Service: -- Author Type: Physician    Filed: 1/10/2018  2:49 PM Encounter Date: 1/10/2018 Status: Signed    : Cory Partida MD (Physician)           Click to link to [facility] HEART CARE NOTE    Thank you, Dr. Hines, for asking us to see Jose Fierro at the [facility].      Assessment/Recommendations   Patient with known vascular disease who his blood pressure in the clinic today is high but multiple blood pressures at home are in the 100-115 or 120 range.  Heart rate is low.    I have asked him to bring his blood pressure cuff into his primary care office to calibrated.  If it is accurate I would not change his medicines.    He will be seen Alma Crockett in our A. fib clinic and I will be interested in her opinion regarding anticoagulation.  It may be useful to decrease the beta-blocker to see if his fatigue improves but then I would lean more towards anticoagulation.  There may be other strategies for A. fib control but I do not he will convince that he knows when he is in atrial fibrillation.    I will plan on seeing him back in 6 months.    Thank you for the opportunity be involved in his care.         History of Present Illness    Mr. Jose Fierro is a 67 y.o. male with known history of aortic dissection, hypertension, and atrial fibrillation.  He is here for follow-up.  He has been evaluated at the [facility] aortic center and no specific therapy for his aortic dissection is been recommended.  He had a carotid ultrasound and peripheral vascular ultrasound and was told that things were as good as could be expected and no specific therapy was recommended.  His blood pressures at home of been excellent.  He does get a little more fatigued of late and does take a beta-blocker and is two-week ACT monitor did not show atrial fibrillation but relative  bradycardia.  His sister tells me that he falls asleep easily but does not snore.    He denies orthopnea, paroxysmal nocturnal dyspnea or peripheral edema and has not had any chest discomfort.       Physical Examination Review of Systems   Vitals:    01/10/18 1407   BP: 160/56   Pulse: (!) 56   Resp: 16     Body mass index is 18.16 kg/(m^2).  Wt Readings from Last 3 Encounters:   01/10/18 123 lb (55.8 kg)   11/29/17 123 lb (55.8 kg)   10/25/17 124 lb (56.2 kg)     General Appearance:   Alert, cooperative and in no acute distress.   ENT/Mouth: Oral mucuos membranes pink and moist .      EYES:  No scleral icterus. No Xanthelasma.    Neck: JVP normal. No Hepatojugular reflux. Thyroid not visualizedly   Chest/Lungs:   Lungs are clear to auscultation, equal chest wall expansion    Cardiovascular:   S1, S2 with over 6 systolic and 1/6 to 2/6 diastolic murmur , no clicks or rubs. Brachial, radial  pulses are intact and symetric. No carotid bruits noted   Abdomen:  Nontender. BS+. No bruits.      Extremities: No cyanosis, clubbing or edema   Skin: no xanthelasma, warm.    Psych: Appropriate affect.   Neurologic: normal gait, normal  bilateral, no tremors        General: WNL  Eyes: WNL  Ears/Nose/Throat: WNL  Lungs: WNL  Heart: WNL  Stomach: WNL  Bladder: WNL  Muscle/Joints: WNL  Skin: WNL  Nervous System: WNL  Mental Health: WNL     Blood: WNL     Medical History  Surgical History Family History Social History   Past Medical History:   Diagnosis Date   ? Aortic dissection 2003    aortic root reconstruction at [facility] in [facility]   ? HTN (hypertension)    ? Hyperlipidemia    ? Peripheral artery disease    ? Stage 3 chronic kidney disease     Past Surgical History:   Procedure Laterality Date   ? reconstruction ascending aorta   2003    [facility]; initial repair and taken back to OR for 5 more hours of surgery    No family history on file. Social History     Social History   ? Marital status: Single     Spouse  name: N/A   ? Number of children: 0   ? Years of education: N/A     Occupational History   ? disabled since 2003      worked at [employer]     Social History Main Topics   ? Smoking status: Current Every Day Smoker   ? Smokeless tobacco: Not on file      Comment: small cigars/   ? Alcohol use No      Comment: quit years ago    ? Drug use: No   ? Sexual activity: Not on file     Other Topics Concern   ? Not on file     Social History Narrative    From [place] had surgery at [facility] for aortic dissection and prolonged recovery then moved to be close to sister in [place] (2003), redo in 2014. Still smokes small cigars. Quit ETOH yrs ago.  years ago. No kids worked at [employer] before disability. Enjoys sports/music/miniature circus hobbyist.           Medications  Allergies   Current Outpatient Prescriptions   Medication Sig Dispense Refill   ? aspirin 81 mg chewable tablet Chew 81 mg daily.     ? cholecalciferol, vitamin D3, 1,000 unit tablet Take 1,000 Units by mouth daily.     ? metoprolol succinate (TOPROL-XL) 50 MG 24 hr tablet Take 1 tablet (50 mg total) by mouth daily. 30 tablet 2   ? simvastatin (ZOCOR) 40 MG tablet Take 40 mg by mouth at bedtime.     ? triamterene-hydrochlorothiazide (MAXZIDE) 75-50 mg per tablet Take 1 tablet by mouth daily.     ? NIFEdipine (ADALAT CC) 90 MG 24 hr tablet Take 90 mg by mouth daily.       No current facility-administered medications for this visit.       Allergies   Allergen Reactions   ? Doxycycline Other (See Comments)   ? Penicillins Other (See Comments)         Lab Results    Chemistry/lipid CBC Cardiac Enzymes/BNP/TSH/INR   Lab Results   Component Value Date    CREATININE 1.79 (H) 10/26/2017    BUN 32 (H) 10/26/2017    K 3.5 10/26/2017     10/26/2017     (H) 10/26/2017    CO2 17 (L) 10/26/2017    Lab Results   Component Value Date    WBC 7.1 10/26/2017    HGB 12.6 (L) 10/26/2017    HCT 37.8 (L) 10/26/2017    MCV 88 10/26/2017     10/26/2017    Lab  Results   Component Value Date    INR 1.13 (H) 10/25/2017

## 2021-06-28 NOTE — PROGRESS NOTES
Progress Notes by Cory Partida MD at 11/20/2019  9:50 AM     Author: Cory Partida MD Service: -- Author Type: Physician    Filed: 11/20/2019 12:31 PM Encounter Date: 11/20/2019 Status: Signed    : Cory Partida MD (Physician)               Assessment/Recommendations   Patient with known history of hypertension as well as vascular disease and repair of a thoracoabdominal dissection 16 years ago.  Today he has evidence for somewhat atypical atrial flutter with a rapid ventricular response.  He does have a chads 2 vascular score of 6 and therefore is a candidate for anticoagulation to reduce the risk of thromboembolic phenomenon.  His creatinine is elevated and his weight is less than 60 kg so he is at a lower dose of Eliquis at 2.5 mg twice daily.  I will prescribe that.    We will get an echocardiogram to evaluate his left ventricular systolic function and I would also like to obtain a Holter monitor.  We will obtain those in the near future.  Pending these results we will potentially recommend electrical cardioversion.  He did have a recent TSH.    He is decided against imaging for his follow-up of the dissection of his aorta and the repair.  There is risk of the contrast and he has been told by surgeons in the past that he be very high risk for any further surgery in this regard and therefore would like to forego the imaging studies at this time.  This seems reasonable but we will continue to follow him.      Thank you for allowing us to participate in his care.       History of Present Illness/Subjective    Mr. Jose Fierro is a 69 y.o. male with known history of thoracal a abdominal aortic dissection..  He had surgery 16 years ago today and he is a little bit emotional about he is noticed that his heart rate is been a little high.  He also has a history of hypertension as well as paroxysmal atrial fibrillation.  From his standpoint he has not had any recurrent atrial fibrillation with no  palpitations, syncope or near syncopal episodes.  His beta-blocker last year was listed at 50 mg a day and now takes 25 mg a day and he is not sure why the dose was decreased.  He has not had any chest discomfort.  He denies peripheral edema, syncope or near syncopal episodes.  Overall he is felt well.    He had a long discussion with Dr. Rodriguez from nephrology regarding imaging studies of his aorta and he decided to not do further imaging studies as the risk of future surgery would be quite high and he would not want to undergo that.  In addition the risk of imaging is not trivial given that his renal insufficiency.    ECG: Personally reviewed.        Physical Examination Review of Systems   Vitals:    11/20/19 0954   BP: 130/80   Pulse: (!) 120     Body mass index is 18.02 kg/m .  Wt Readings from Last 3 Encounters:   11/20/19 122 lb (55.3 kg)   04/17/19 124 lb (56.2 kg)   01/24/18 122 lb 6.4 oz (55.5 kg)     General Appearance:   Alert, cooperative and in no acute distress.   ENT/Mouth: Oral mucuos membranes pink and moist .      EYES:  no scleral icterus, normal conjunctivae   Neck: JVP normal. No Hepatojugular reflux. Thyroid not visualized.   Chest/Lungs:   Lungs are clear to auscultation, equal chest wall expansion.   Cardiovascular:   S1, S2 with an over 6 systolic murmur , no clicks or rubs. Brachial, radial  pulses are intact and symetric. No carotid bruits noted   Abdomen:  Nontender. BS+.    Extremities: No cyanosis, clubbing or edema   Skin: no xanthelasma, warm.    Neurologic: normal  bilateral, no tremors     Psychiatric: Appropriate affect.                                                  Medical History  Surgical History Family History Social History   Past Medical History:   Diagnosis Date   ? Aortic dissection (H) 2003    aortic root reconstruction at [facility] in [facility]   ? HTN (hypertension)    ? Hyperlipidemia    ? Peripheral artery disease (H)    ? Stage 3 chronic kidney disease (H)    ? TIA  (transient ischemic attack)     Past Surgical History:   Procedure Laterality Date   ? reconstruction ascending aorta   [place]    [facility] and taken back to OR for 5 more hours of surgery    Family History   Problem Relation Age of Onset   ? Sudden death Mother    ? No Medical Problems Sister    ? Acute Myocardial Infarction Paternal Grandfather     Social History     Socioeconomic History   ? Marital status: Single     Spouse name: Not on file   ? Number of children: 0   ? Years of education: Not on file   ? Highest education level: Not on file   Occupational History   ? Occupation: disabled since 2003     Comment: worked at [employer]   Social Needs   ? Financial resource strain: Not on file   ? Food insecurity:     Worry: Not on file     Inability: Not on file   ? Transportation needs:     Medical: Not on file     Non-medical: Not on file   Tobacco Use   ? Smoking status: Current Every Day Smoker   ? Smokeless tobacco: Never Used   ? Tobacco comment: small cigars/   Substance and Sexual Activity   ? Alcohol use: No     Comment: quit years ago    ? Drug use: No   ? Sexual activity: Not on file   Lifestyle   ? Physical activity:     Days per week: Not on file     Minutes per session: Not on file   ? Stress: Not on file   Relationships   ? Social connections:     Talks on phone: Not on file     Gets together: Not on file     Attends Scientologist service: Not on file     Active member of club or organization: Not on file     Attends meetings of clubs or organizations: Not on file     Relationship status: Not on file   ? Intimate partner violence:     Fear of current or ex partner: Not on file     Emotionally abused: Not on file     Physically abused: Not on file     Forced sexual activity: Not on file   Other Topics Concern   ? Not on file   Social History Narrative    From [place] had [place] [facility] [place] and prolonged recovery then moved to be close to [facility] in [place] ([place]), redo in 2014. Still smokes small cigars. Quit ETOH yrs ago.  years ago. No kids worked at [employer] before disability. Enjoys sports/music/miniature circus hobbyist.           Medications  Allergies   Current Outpatient Medications   Medication Sig Dispense Refill   ? aspirin 81 mg chewable tablet Chew 81 mg daily.     ? cholecalciferol, vitamin D3, 1,000 unit tablet Take 1,000 Units by mouth daily.     ? lisinopril (PRINIVIL,ZESTRIL) 2.5 MG tablet Take 2.5 mg by mouth daily.     ? metoprolol succinate (TOPROL-XL) 50 MG 24 hr tablet Take 1 tablet (50 mg total) by mouth daily. (Patient taking differently: Take 25 mg by mouth daily.       ) 90 tablet 3   ? simvastatin (ZOCOR) 40 MG tablet Take 40 mg by mouth at bedtime.       No current facility-administered medications for this visit.     Allergies   Allergen Reactions   ? Doxycycline Other (See Comments)   ? Penicillins Other (See Comments)         Lab Results    Chemistry/lipid CBC Cardiac Enzymes/BNP/TSH/INR   Lab Results   Component Value Date    CREATININE 1.79 (H) 10/26/2017    BUN 32 (H) 10/26/2017    K 3.5 10/26/2017     10/26/2017     (H) 10/26/2017    CO2 17 (L) 10/26/2017    Lab Results   Component Value Date    WBC 7.1 10/26/2017    HGB 12.6 (L) 10/26/2017    HCT 37.8 (L) 10/26/2017    MCV 88 10/26/2017     10/26/2017    Lab Results   Component Value Date    INR 1.13 (H) 10/25/2017

## 2021-06-28 NOTE — PROGRESS NOTES
Progress Notes by Cory Partida MD at 2/5/2020 11:10 AM     Author: Cory Partida MD Service: -- Author Type: Physician    Filed: 2/5/2020 12:12 PM Encounter Date: 2/5/2020 Status: Signed    : Cory Partida MD (Physician)               Assessment/Recommendations   Patient with known vascular disease with history of aortic dissection with repair in the very distant past.  He also has hypertension.  He had atrial dysrhythmias and an echocardiogram is suggested apical wall motion normality.  A nuclear study suggested severe ischemia in the mid and apical portion of the anterior wall as well as a small area in the inferior wall.  He is not having anginal symptoms but he had quite significant changes on his nuclear exercise test that suggest significant epicardial coronary artery disease.  He also has renal insufficiency.    We will check his basic metabolic panel today and a CBC today.  If his creatinine is stable, I would favor a coronary angiogram with limited contrast.  I would discuss that with his nephrologist prior to completing this.    We have also increase his metoprolol to 75 mg a day.       History of Present Illness/Subjective    Mr. Jose Fierro is a 69 y.o. male with known history of hypertension and repair of an aortic dissection many years ago.  We have discontinued routine monitoring of his aortic dissection as he is decided that there is no way that he would undergo surgery again.  We have been treating his blood pressure and he did developed atrial dysrhythmias and an echocardiogram showed an apical wall motion abnormality which was new.  Pharmacologic nuclear study showed significant ischemia in the mid and apical portion of the anterior wall as well as the inferior wall.  He is not having angina.  He feels like his breathing is comfortable and feels like his functional capacity is stable.  He is not particularly active.  He is not had syncopal or near syncopal episodes.            Physical Examination Review of Systems   Vitals:    02/05/20 1110   BP: 120/70   Pulse: 96   Resp: 14     Body mass index is 18.4 kg/m .  Wt Readings from Last 3 Encounters:   02/05/20 121 lb (54.9 kg)   01/22/20 122 lb (55.3 kg)   01/08/20 122 lb (55.3 kg)     General Appearance:   Alert, cooperative and in no acute distress.   ENT/Mouth: Oral mucuos membranes pink and moist .      EYES:  no scleral icterus, normal conjunctivae   Neck: JVP normal. No Hepatojugular reflux. Thyroid not visualized.   Chest/Lungs:   Lungs have diminished breath sounds, equal chest wall expansion.   Cardiovascular:   S1, S2 with 1/6 systolic murmur , no clicks or rubs. Brachial, radial  pulses are intact and symetric. No carotid bruits noted   Abdomen:  Nontender. BS+.    Extremities: No cyanosis, clubbing or edema   Skin: no xanthelasma, warm.    Neurologic: normal  bilateral, no tremors     Psychiatric: Appropriate affect.      General: WNL  Eyes: WNL  Ears/Nose/Throat: WNL  Lungs: WNL  Heart: WNL  Stomach: WNL  Bladder: WNL  Muscle/Joints: WNL  Skin: WNL  Nervous System: WNL  Mental Health: WNL     Blood: WNL       Medical History  Surgical History Family History Social History   Past Medical History:   Diagnosis Date   ? Aortic dissection (H) 2003    aortic root reconstruction at [facility]   ? HTN (hypertension)    ? Hyperlipidemia    ? Peripheral artery disease (H)    ? Stage 3 chronic kidney disease (H)    ? TIA (transient ischemic attack)     Past Surgical History:   Procedure Laterality Date   ? reconstruction ascending aorta   2003    [facility] [place] and taken back to OR for 5 more hours of surgery    Family History   Problem Relation Age of Onset   ? Sudden death Mother    ? No Medical Problems Sister    ? Acute Myocardial Infarction Paternal Grandfather     Social History     Socioeconomic History   ? Marital status: Single     Spouse name: Not on file   ? Number of children: 0   ? Years  of education: Not on file   ? Highest education level: Not on file   Occupational History   ? Occupation: disabled since 2003     Comment: worked at [employer]   Social Needs   ? Financial resource strain: Not on file   ? Food insecurity:     Worry: Not on file     Inability: Not on file   ? Transportation needs:     Medical: Not on file     Non-medical: Not on file   Tobacco Use   ? Smoking status: Current Every Day Smoker   ? Smokeless tobacco: Never Used   ? Tobacco comment: small cigars/   Substance and Sexual Activity   ? Alcohol use: No     Comment: quit years ago    ? Drug use: No   ? Sexual activity: Not on file   Lifestyle   ? Physical activity:     Days per week: Not on file     Minutes per session: Not on file   ? Stress: Not on file   Relationships   ? Social connections:     Talks on phone: Not on file     Gets together: Not on file     Attends Gnosticist service: Not on file     Active member of club or organization: Not on file     Attends meetings of clubs or organizations: Not on file     Relationship status: Not on file   ? Intimate partner violence:     Fear of current or ex partner: Not on file     Emotionally abused: Not on file     Physically abused: Not on file     Forced sexual activity: Not on file   Other Topics Concern   ? Not on file   Social History Narrative    From [place] had surgery at [facility] for aortic dissection and prolonged recovery then moved to be close to sister in [place] (2003), redo in 2014. Still smokes small cigars. Quit ETOH yrs ago.  years ago. No kids worked at [employer] before disability. Enjoys sports/music/miniature circus hobbyist.           Medications  Allergies   Current Outpatient Medications   Medication Sig Dispense Refill   ? apixaban (ELIQUIS) 2.5 mg Tab tablet Take 1 tablet (2.5 mg total) by mouth 2 (two) times a day. 60 tablet 5   ? aspirin 81 mg chewable tablet Chew 81 mg daily.     ? cholecalciferol, vitamin D3, 1,000 unit tablet Take 1,000 Units  by mouth daily.     ? lisinopril (PRINIVIL,ZESTRIL) 2.5 MG tablet Take 2.5 mg by mouth daily.     ? metoprolol succinate (TOPROL-XL) 50 MG 24 hr tablet Take 1.5 tablets (75 mg total) by mouth daily. 90 tablet 3   ? simvastatin (ZOCOR) 40 MG tablet Take 40 mg by mouth at bedtime.     ? triamterene-hydrochlorothiazide (MAXZIDE) 75-50 mg per tablet Take 1 tablet by mouth daily.       No current facility-administered medications for this visit.     Allergies   Allergen Reactions   ? Doxycycline Other (See Comments)   ? Penicillins Other (See Comments)         Lab Results    Chemistry/lipid CBC Cardiac Enzymes/BNP/TSH/INR   Lab Results   Component Value Date    CREATININE 1.79 (H) 10/26/2017    BUN 32 (H) 10/26/2017    K 3.5 10/26/2017     10/26/2017     (H) 10/26/2017    CO2 17 (L) 10/26/2017    Lab Results   Component Value Date    WBC 7.1 10/26/2017    HGB 12.6 (L) 10/26/2017    HCT 37.8 (L) 10/26/2017    MCV 88 10/26/2017     10/26/2017    Lab Results   Component Value Date    INR 1.13 (H) 10/25/2017

## 2022-02-11 VITALS — DIASTOLIC BLOOD PRESSURE: 66 MMHG | HEART RATE: 74 BPM | SYSTOLIC BLOOD PRESSURE: 107 MMHG

## 2022-02-11 VITALS
DIASTOLIC BLOOD PRESSURE: 60 MMHG | BODY MASS INDEX: 18.31 KG/M2 | SYSTOLIC BLOOD PRESSURE: 122 MMHG | DIASTOLIC BLOOD PRESSURE: 68 MMHG | WEIGHT: 131.8 LBS | WEIGHT: 123.6 LBS | SYSTOLIC BLOOD PRESSURE: 114 MMHG | HEART RATE: 74 BPM | HEIGHT: 69 IN | HEIGHT: 69 IN | HEART RATE: 64 BPM | BODY MASS INDEX: 19.52 KG/M2

## 2022-02-12 VITALS
SYSTOLIC BLOOD PRESSURE: 138 MMHG | WEIGHT: 133.8 LBS | HEIGHT: 69 IN | HEIGHT: 69 IN | OXYGEN SATURATION: 97 % | DIASTOLIC BLOOD PRESSURE: 72 MMHG | BODY MASS INDEX: 19.82 KG/M2 | WEIGHT: 136.4 LBS | HEART RATE: 51 BPM | SYSTOLIC BLOOD PRESSURE: 160 MMHG | OXYGEN SATURATION: 97 % | HEART RATE: 50 BPM | DIASTOLIC BLOOD PRESSURE: 50 MMHG | BODY MASS INDEX: 20.2 KG/M2

## 2022-02-12 VITALS
HEART RATE: 58 BPM | SYSTOLIC BLOOD PRESSURE: 142 MMHG | WEIGHT: 132 LBS | BODY MASS INDEX: 19.49 KG/M2 | DIASTOLIC BLOOD PRESSURE: 60 MMHG

## 2022-02-12 VITALS
SYSTOLIC BLOOD PRESSURE: 130 MMHG | DIASTOLIC BLOOD PRESSURE: 70 MMHG | WEIGHT: 122 LBS | BODY MASS INDEX: 18.07 KG/M2 | HEIGHT: 69 IN | HEART RATE: 74 BPM | TEMPERATURE: 97.6 F | HEART RATE: 100 BPM | BODY MASS INDEX: 18.01 KG/M2 | DIASTOLIC BLOOD PRESSURE: 64 MMHG | HEIGHT: 69 IN | SYSTOLIC BLOOD PRESSURE: 122 MMHG | WEIGHT: 121.6 LBS

## 2022-02-12 VITALS — SYSTOLIC BLOOD PRESSURE: 132 MMHG | DIASTOLIC BLOOD PRESSURE: 70 MMHG

## 2022-02-12 VITALS
SYSTOLIC BLOOD PRESSURE: 128 MMHG | BODY MASS INDEX: 17.74 KG/M2 | HEART RATE: 74 BPM | HEIGHT: 69 IN | DIASTOLIC BLOOD PRESSURE: 60 MMHG | WEIGHT: 119.8 LBS | WEIGHT: 127 LBS | HEIGHT: 69 IN | DIASTOLIC BLOOD PRESSURE: 66 MMHG | SYSTOLIC BLOOD PRESSURE: 112 MMHG | HEART RATE: 72 BPM | BODY MASS INDEX: 18.81 KG/M2

## 2022-02-16 NOTE — CARE COORDINATION
Pt appears on  [facility] chronic disease panel as out of parameters for tobacco use.  No discussion at last OV.  Can be discussed at next visit.  Diane Arrieta CMA.

## 2022-02-16 NOTE — TELEPHONE ENCOUNTER
Entered by Evelin Farley CMA on May 13, 2020 1:32:43 PM CDT  ---------------------  From: Evelin Farley CMA   To: [facility] #[id number]    Sent: 5/13/2020 1:32:43 PM CDT  Subject: Medication Management     ** Submitted: **  Order:metoprolol (Metoprolol Succinate ER 25 mg oral tablet, extended release)  4 tab(s)  Oral  daily  Qty:  360 tab(s)        Refills:  1          Substitutions Allowed     Route To Pharmacy - [facility] #[id number]    Signed by Evelin Farley CMA  5/13/2020 6:31:00 PM    ** Submitted: **  Complete:metoprolol (Metoprolol Succinate ER 25 mg oral tablet, extended release)   Signed by Evelin Farley CMA  5/13/2020 6:32:00 PM    ** Not Approved:  **  metoprolol (METOPROLOL ER SUCCINATE 25MG TABS)  TAKE 4 TABLETS BY MOUTH DAILY  Qty:  368 tab(s)        Days Supply:  90        Refills:  0          Substitutions Allowed     Route To Pharmacy - [facility] #[id number]   Note from Pharmacy:  **Patient requests 90 days supply**  Signed by Evelin Farley CMA            ------------------------------------------  From: [facility] #[id number]  To: Luis Hines MD  Sent: May 12, 2020 9:58:58 AM CDT  Subject: Medication Management  Due: May 13, 2020 9:58:58 AM CDT    ** On Hold Pending Signature **  Drug: metoprolol (Metoprolol Succinate ER 25 mg oral tablet, extended release)  TAKE 4 TABLETS BY MOUTH DAILY  Quantity: 368 tab(s)  Days Supply: 90  Refills: 0  Substitutions Allowed  Notes from Pharmacy: **Patient requests 90 days supply**    Dispensed Drug: metoprolol (Metoprolol Succinate ER 25 mg oral tablet, extended release)  TAKE 4 TABLETS BY MOUTH DAILY  Quantity: 368 tab(s)  Days Supply: 90  Refills: 0  Substitutions Allowed  Notes from Pharmacy: **Patient requests 90 days supply**  ------------------------------------------Pt was seen in office 5/12/2020. Due next appt in 6 months

## 2022-02-16 NOTE — NURSING NOTE
Placed 24 holter #[id number] @ 1442 per Dr. Ray [facility] for dx of PAF. Gave patient written & verbal instructions and sent patient home with diary. Patient indicated he understood. Will return in 24 hours for removal.Holter removed, uploaded, and forms faxed to [facility]. Will print report when it is received.Holter down loaded and report printed sent to [facility]. Called [facility] for this report.Routed Holter Monitor Report to [facility] for interpretation.

## 2022-02-16 NOTE — LETTER
(Inserted Image. Unable to display)       March 18, 2019      YESYS MACKAY  [address]  [place], [place] [id number]          Dear YESSY,      Thank you for selecting [facility] for your healthcare needs.     Our records indicate you are due for the following services:     Non-Fasting Labs    If you had your labs done at another facility or with Direct Access Lab Testing at Critical access hospital, please bring in a copy of the results to your next visit, mail a copy, or drop off a copy of your results to your Healthcare Provider.    To schedule an appointment or if you have further questions, please contact your primary clinic:   [facility]       [phone]   [facility]       [phone]              [facility]     [phone]    Powered by Direct Access Software    Sincerely,    Luis Hines MD

## 2022-02-16 NOTE — RESULTS
(Inserted Image. Unable to display)          (Inserted Image. Unable to display)     [address] [place], [place] [address]  Phone [phone]  Fax [phone]  HOLTER MONITOR REPORT    YESSY MACKAY : 1950  Date of Exam: 2020 at 2:42 p.m.   MRN: [id number]  Ordering HCP:  Dr. Fausto Partida      INDICATION:  Atrial fibrillation.     FINDINGS: The Holter monitor recording period was 23 hours and 51 minutes.       The underlying rhythm is atrial flutter with variable conduction.  Average rate of 72.  Minimum rate of 47.  Maximum rate of 133.                      Ventricular ectopics number 2350 accounting for 2.3% of all beats including 5 triplets and 95 couplets.          A single patient symptom episode with shortness of breath noted at 2:56 p.m. associated rhythm was atrial flutter with controlled ventricular response.         IMPRESSION:    1. Atrial flutter with controlled ventricular response.   2. Relatively frequent ventricular ectopics including 5 triplets.   3. Patient symptom event without dysrhythmia other than the underlying atrial flutter.         Noé Thrasher MD, FACP  Interpreting HCP                    GOSIADL/clem   D:  2020                                                                          T:  2020    HOLTER MONITOR REPORT

## 2022-02-16 NOTE — LETTER
(Inserted Image. Unable to display)   Gemma 10, 2021  YESSY MACKAY  [address]  [place] [address]          Dear YESSY,      Thank you for selecting [facility] for your healthcare needs.    Our records indicate you are due for the following services:     Kidney Disease Follow Up & Hypertension check ~ please remember to bring your at-home blood pressure readings with you to your appointment.     (FYI   Regarding office visits: In some instances, a video visit or telephone visit may be offered as an option.)    To schedule an appointment or if you have further questions, please contact your clinic at [phone].      Powered by [employer] and [employer]    Sincerely,    Luis Hines MD

## 2022-02-16 NOTE — LETTER
(Inserted Image. Unable to display)     November 16, 2020      YESSY MACKAY  [address]  [place], [place] [id number]          Dear YESSY,      Thank you for selecting [facility] ([facility]) for your healthcare needs.    Our records indicate you are due for the following services:    Hypertension check ~ please remember to bring your at-home blood pressure readings with you to your appointment.   Kidney Disease Follow Up.  Fasting Lab Tests ~ Please do not eat or drink anything 10 hours prior to your scheduled appointment time.  (Water and any medications that you may need are allowed unless directed otherwise.)    If you had your labs done at another facility or with Direct Access Lab Testing at [facility], please bring in a copy of the results to your next visit, mail a copy, or drop off a copy of your results to your Healthcare Provider.    (FYI   Regarding office visits: In some instances, a video visit or telephone visit may be offered as an option.)    You are due for lab work and an office visit; please schedule the lab appointment 1 week before the office visit.  This will assure all results are available to discuss with your Healthcare Provider during your visit.    **It is very helpful if you bring your medication bottles to your appointment.  This assures we have all of your current medications, including strength and dosing information, documented accurately in your medical record.    To schedule an appointment or if you have further questions, please contact your clinic at [phone].      Powered by [email or web address]    Sincerely,    Luis Hines MD

## 2022-02-16 NOTE — TELEPHONE ENCOUNTER
---------------------  From: Asuncion Kwong LPN (Phone Messages Pool ([address] [place] [address]))   To: [facility] Message Pool ([address] [place] [address]);     Sent: 4/10/2019 9:36:31 AM CDT  Subject: labs/medication     Phone Message    PCP:   PEGGY      Time of Call:  8:51am       Person Calling: Pt   Phone number:  [phone]    Note:    Pt LM stating he received his test results last week. Pt says he was started on a new medication 1 month ago and thinks that is why he had this blood test done. Pt is wondering if he should stop the medication before he sees PEGGY?     pt started on Lisinopril 2.5mg daily per message 2/27    Last office visit and reason:  2/27/19 Medication Check up---------------------  From: Raquel Amezcua MA ([facility] Message Pool ([address] [place] [address]))   To: Luis Hines MD;     Sent: 4/10/2019 9:37:54 AM CDT  Subject: FW: labs/medication---------------------  From: Luis Hines MD   To: [facility] Message Pool ([address] [place] [address]);     Sent: 4/10/2019 12:43:13 PM CDT  Subject: RE: labs/medication     i will recheck chem 8 when in and decide then if medication is too much stress on kidneys---------------------  From: Raquel Amezcua MA ([facility] Message Pool ([address] [place] [address]))   To: Luis Hines MD;     Sent: 4/10/2019 1:58:07 PM CDT  Subject: FW: labs/medication     Please advise     I called patient and let him know that we would like to see him for a visit and he should stay on the low dose of Lisinopril until he comes in to discuss further. He did just do his labs and your results letter said that his kidneys are under a little more stress and you would like to see him to discuss changing or altering medications. He says he is going to see  Cardiologist next Wednesday and wants to know if you would like to see him before he goes to see ? I let him know I would call him back with your response.     Time of call: 1:57pm    Thank you    Belinda LESTER  LUIS EDUARDO---------------------  From: Luis Hines MD   To: [facility] Message Pool ([id number]);     Sent: 4/10/2019 2:31:02 PM CDT  Subject: RE: labs/medication     yes see himI called pt, he will figure out when he can get a ride and will call  back to schedule an appt.     Time of call: 3:14pm    KRUPA HOFF

## 2022-02-16 NOTE — TELEPHONE ENCOUNTER
---------------------  From: Tiffanie Martin CMA ([facility] Message Pool ([id number]))   To: Luis Hines MD;     Sent: 10/20/2020 7:57:37 AM CDT  Subject: metoprolol pharm q     Fax received from  [facility] stating pt is requesting metoprolol er succinate to be changed to 100mg tablet vs taking four 25mg tabs. Agree to change?---------------------  From: Luis Hines MD   To: [facility] Message Pool ([id number] [facility] [place]);     Sent: 10/20/2020 8:58:56 AM CDT  Subject: RE: metoprolol pharm q     okprescription updated

## 2022-02-16 NOTE — TELEPHONE ENCOUNTER
---------------------  From: Isadora Matt LPN (Phone Messages Pool ([id number]))   To: [facility] Message Pool ([id number]);     Sent: 2/19/2019 10:18:17 AM CST  Subject: Med ?'s        Phone Message    PCP:   PEGGY      Time of Call:  0913       Person Calling:  Patient   Phone number:  [phone]    Returned call at: [phone]    Note:   Patient is calling to discuss his cardiologist medications with PEGGY.  Patient states that he saw Dr. Lovelace and he started him on losartan.  Patient states he then was having trouble with cough, SOB, and wheezing.  He took himself off the med last week when he couldn't even make it to the mailbox and back without becoming SOB.  He started himself back on hydrochlorothiazide that he was taking previously and now still is having BP that is all over the place but no SOB and chest pressure is improved.  Cardiologist also cut his metoprolol in half which he was told would increase his heart rate however patient state it has not.  Cardiologist has also been talking about taking patient off of simvastatin.  Patient would like to get [facility] thoughts on all this. He trusts [facility] and has seen him for 16 years (per patient), and he is not liking the Cardiologist and feeling like his guinea pig.  Patient is requesting that PEGGY review and give his thoughts on this to him- maybe another cardiologist?     Last office visit and reason:  10/11/2018 Chronic Disease Follow up---------------------  From: Raquel Amezcua MA ([facility] Message Pool ([id number]))   To: Luis Hines MD;     Sent: 2/19/2019 1:34:57 PM CST  Subject: FW: Med ?'s---------------------  From: Luis Hines MD   To: [facility] Message Pool ([id number]);     Sent: 2/19/2019 2:13:36 PM CST  Subject: RE: Med ?'s      these are good questions that I should address in personCalled to pt and informed him it would be best if he came in for a visit, he is house sitting for his sister but will make an  appt for next week, no other ?'s at this time.     Time of call: 4:49pm    KRUPA HOFF

## 2022-02-16 NOTE — LETTER
(Inserted Image. Unable to display)   October 03, 2019        YESSY MACKAY      [address] [place] [id number]        Dear YESSY,    Thank you for choosing [facility] for your healthcare needs. Below you will find the results of your recent test(s) done at our clinic.      your labs show stable but poor function in your kidneys.  the other labs are looking good.      Result Name Current Result Previous Result Reference Range   Sodium Level (mmol/L)  137 10/2/2019  138 7/2/2019 135 - 146   Potassium Level (mmol/L)  4.1 10/2/2019  4.2 7/2/2019 3.5 - 5.3   Chloride Level (mmol/L) ((H)) 112 10/2/2019 ((H)) 114 7/2/2019 98 - 110   CO2 Level (mmol/L) ((L)) 17 10/2/2019 ((L)) 17 7/2/2019 20 - 32   Glucose Level (mg/dL)  80 10/2/2019  87 7/2/2019 65 - 99   BUN (mg/dL) ((H)) 46 10/2/2019 ((H)) 42 7/2/2019 7 - 25   Creatinine Level (mg/dL) ((H)) 2.22 10/2/2019 ((H)) 2.32 7/2/2019 0.70 - 1.25   BUN/Creat Ratio  21 10/2/2019  18 7/2/2019 6 - 22   eGFR (mL/min/1.73m2) ((L)) 30 10/2/2019 ((L)) 28 7/2/2019 > OR = 60 -    eGFR  (mL/min/1.73m2) ((L)) 34 10/2/2019 ((L)) 32 7/2/2019 > OR = 60 -    Calcium Level (mg/dL)  9.1 10/2/2019  9.1 10/2/2019 8.6 - 10.3   Calcium Level (mg/dL)  9.1 10/2/2019  9.1 10/2/2019 8.6 - 10.3   PTH Intact (pg/mL)  24 10/2/2019  14 - 64   Cholesterol (mg/dL)  132 10/2/2019  139 10/11/2018  - <200   Non-HDL Cholesterol  84 10/2/2019  91 10/11/2018  - <130   HDL (mg/dL)  48 10/2/2019  48 10/11/2018 >40 -    Cholesterol/HDL Ratio  2.8 10/2/2019  2.9 10/11/2018  - <5.0   LDL  67 10/2/2019  76 10/11/2018    Triglyceride (mg/dL)  83 10/2/2019  72 10/11/2018  - <150   T4 Free (ng/dL)  0.9 10/2/2019  1.0 2/27/2019 0.8 - 1.8   TSH (mIU/L)  4.15 10/2/2019 ((H)) 4.58 2/27/2019 0.40 - 4.50   WBC  9.3 10/2/2019  9.8 4/15/2019 3.8 - 10.8   RBC  4.61 10/2/2019  4.47 4/15/2019 4.20 - 5.80   Hgb (gm/dL)  13.2 10/2/2019 ((L)) 13.1 4/15/2019 13.2 - 17.1   Hct (%)  40.4  10/2/2019  39.0 4/15/2019 38.5 - 50.0   MCV (fL)  87.6 10/2/2019  87 4/15/2019 80.0 - 100.0   MCH (pg)  28.6 10/2/2019  29.3 4/15/2019 27.0 - 33.0   MCHC (gm/dL)  32.7 10/2/2019  33.6 4/15/2019 32.0 - 36.0   RDW (%)  13.6 10/2/2019  14.7 4/15/2019 11.0 - 15.0   Platelet  231 10/2/2019  235 4/15/2019 140 - 400   MPV (fL)  9.6 10/2/2019  9.2 4/15/2019 7.5 - 12.5   UA Color  YELLOW 10/2/2019  YELLOW 10/11/2018 YELLOW -    UA Appear  CLEAR 10/2/2019  CLEAR 10/11/2018 CLEAR -    UA pH  6.0 10/2/2019  6.5 10/11/2018 5.0 - 8.0   UA Specific [place]  1.013 10/2/2019  1.016 10/11/2018 1.001 - 1.035   UA Glucose  NEGATIVE 10/2/2019  NEGATIVE 10/11/2018 NEGATIVE - NEGATIVE   UA Bilirubin  NEGATIVE 10/2/2019  NEGATIVE 10/11/2018 NEGATIVE - NEGATIVE   UA Ketones  NEGATIVE 10/2/2019  NEGATIVE 10/11/2018 NEGATIVE - NEGATIVE   UA Blood  NEGATIVE 10/2/2019  NEGATIVE 10/11/2018 NEGATIVE - NEGATIVE   UA Protein  NEGATIVE 10/2/2019  NEGATIVE 10/11/2018 NEGATIVE - NEGATIVE   UA Nitrite  NEGATIVE 10/2/2019  NEGATIVE 10/11/2018 NEGATIVE - NEGATIVE   UA Leukocyte Esterase  NEGATIVE 10/2/2019  NEGATIVE 10/11/2018 NEGATIVE - NEGATIVE       Please contact me or my assistant at [phone] if you have any questions or concerns.     Sincerely,        Luis Hines MD        What do your labs mean?  Below is a glossary of commonly ordered labs:  LDL   Bad Cholesterol   HDL   Good Cholesterol  AST/ALT   Liver Function   Cr/Creatinine   Kidney Function  Microalbumin   Kidney Function  BUN   Kidney Function  PSA   Prostate    TSH   Thyroid Hormone  HgbA1c   Diabetes Test   Hgb (Hemoglobin)   Red Blood Cells

## 2022-02-16 NOTE — PROGRESS NOTES
"   Patient:   YESSY MACKAY            MRN: [id number]            FIN: [id number]               Age:   68 years     Sex:  Male     :  1950   Associated Diagnoses:   Hypertension; Atrial fibrillation with RVR; Peripheral vascular disease; History of transient ischemic attack (TIA); Chronic kidney disease (CKD), stage III (moderate); Dyslipidemia; TOBACCO USE DISORDER   Author:   Luis Hines MD      Chief Complaint   10/11/2018 8:11 AM CDT   RX renewals yearly fu,      History of Present Illness   see chief complaint as noted above and confirmed with the patient     68 year old male presents today for chronic disease follow up.     Afib: Sees Cardiologist . Per Cardio note he has a CHADS vas core of \"5\". He had an echo done  showing chronic aortic dissection in abdominal aorta. He says he has had afib and was put on Metoprolol, he denies rapid heart rate, he says he has felt good. He was recently told he has to see another cardiologist due to insurance reasons. He checks his pulse twice daily, it stays pretty consistent, pulse running 50's-60's, there was a few  pulses that were tracked in his log that were around 70. He says when he was in the 70's he was feeling a bit more energized and alert. There was talk with cardiologist about having a watchmen placed, he has not gone through with this yet, may in the near future.     Hypertension: He is on Hydrochlorithiazide-Triamterene, he logs blood pressure, readings look good running 120's to 130's and 70's to 80's. He denies any chest pains or pressures.     Chronic kidney disease: Last creatanine checked 17 at \"2.09\".    PVD: Cramping in legs, noticed once in a while, he notices more if he does not drink enough water. The cramping is mostly present at night, sometimes it will wake him up at night, he will get up to walk around, and also uses a cold can of soda to the painful areas and this helps. He says the cramping is in his calves, he " denies pain or tingling in his toes or feet.      Smoker: He is a current smoker, he has cut back to one pack a week. Some coughing and shortness of breath noticed more with walking around. Once he sits to rest it quickly improves.     Excercise: He takes the stairs as he lives on the third floor he will go up and down the stairs at least once or twice, he takes the garbage out and says this is a few  blocks. He is able to do this, he sometimes gets a bit out of breath.     Diet: He tries to eat two good meals a day, with meat, vegetables, and some fruit.     Colon Cancer Screen: He has not had a Colonoscopy. Denies blood in stool, denies rectal pain, denies diarrhea, and denies constipation.     Prostate: No concerns with urination,he urinates normally, he denies any pain or urgency, does not wake up often during the night to urinate. Mentions his father has issues with his prostate yet had multiple symptoms. He believes he has a PSA tests many years ago and it was normal.       Review of Systems   Constitutional:  No fever, No fatigue, No decreased activity.    Eye:  No blurring, No double vision.    Ear/Nose/Mouth/Throat:  No ear pain, No nasal congestion, No sore throat.    Respiratory:  No shortness of breath, No cough, No sputum production, No wheezing.    Cardiovascular:  No chest pain, No palpitations, No peripheral edema, No syncope.    Gastrointestinal:  No nausea, No vomiting, No diarrhea, No constipation, No heartburn, No abdominal pain.    Genitourinary:  No dysuria, No hematuria, No change in urine stream.    Hematology/Lymphatics:  No swollen lymph glands.    Musculoskeletal:  No back pain, No neck pain, No muscle pain, No claudication.    Integumentary:  No rash.    Neurologic:  Alert and oriented X4, No abnormal balance, No confusion, No numbness, No tingling, No headache.    Psychiatric:  No anxiety, No depression.       Health Status   Allergies:    Allergic Reactions (Selected)  Severity Not  Documented  Penicillin (High fever)  Vibramycin (High fever)   Medications:  (Selected)   Prescriptions  Prescribed  hydrochlorothiazide-triamterene 50 mg-75 mg oral tablet: 1 tab(s), po, daily, # 90 tab(s), 3 Refill(s), Type: Maintenance, Pharmacy: [id number], 1 tab(s) po daily,x90 day(s)  simvastatin 40 mg oral tablet: 1 tab(s) ( 40 mg ), po, hs, # 90 tab(s), 3 Refill(s), Type: Maintenance, Pharmacy: [facility] #[id number], 1 tab(s) po hs,x90 day(s)  Documented Medications  Documented  Vitamin D with Minerals oral tablet: 1 tab(s), po, daily, 0 Refill(s), Type: Maintenance  aspirin 81 mg oral delayed release tablet: = 1 tab(s) ( 81 mg ), PO, Daily, # 30 tab(s), 0 Refill(s), Type: Maintenance  ibuprofen: Instructions: as needed, 0 Refill(s), Type: Maintenance  metoprolol succinate 50 mg oral tablet, extended release: 1 tab(s) ( 50 mg ), po, daily, 0 Refill(s), Type: Maintenance   Problem list:    All Problems  TOBACCO USE DISORDER / ICD-9-.1 / Confirmed  Peripheral vascular disease / SNOMED CT 7845857125 / Confirmed  Hypertension / SNOMED CT 0486123760 / Confirmed  History of transient ischemic attack (TIA) / SNOMED CT 871619268 / Confirmed  Dyslipidemia / SNOMED CT 5651964352 / Confirmed  Chronic kidney disease (CKD), stage III (moderate) / SNOMED CT 8259468438 / Confirmed  Back pain / SNOMED CT 747408192 / Confirmed  Atrial fibrillation with RVR / SNOMED CT 7555262386 / Confirmed  Amaurosis fugax / SNOMED CT 658016435 / Confirmed  Resolved: Inpatient stay / SNOMED CT 411045418  Canceled: thoracic artery dissection      Histories   Past Medical History:    Active  Hypertension (4098666401)  Amaurosis fugax (693287966)  TOBACCO USE DISORDER (305.1)  Peripheral vascular disease (2731241516)  Dyslipidemia (3767959528)  Back pain (534018633)  History of transient ischemic attack (TIA) (970421571)  Chronic kidney disease (CKD), stage III (moderate) (6838778215)  Atrial fibrillation with RVR  (6525505170)  Resolved  Inpatient stay (024996374): Onset on 10/25/2017 at 67 years.  Resolved on 10/26/2017 at 67 years.  Comments:  11/6/2017 CST 10:40 AM CST - Radha Moran  @[place], [place] - Atrial fibrillation with RVR, aortic dissection   Family History:    No family history items have been selected or recorded.   Procedure history:    Echocardiogram (1506328933) on 10/25/2017 at 67 Years.  Comments:  11/1/2017 12:34 PM - Raquel Amezcua MA  ejection fraction of 55-60%.  Dissecting thoracic aneurysm on 11/20/2003 at 53 Years.  Patch angioplasty of artery (754843616) on 11/20/2003 at 53 Years.   Social History:        Alcohol Assessment            Current      Tobacco Assessment: Current        Physical Examination   Vital Signs   10/11/2018 8:11 AM CDT Peripheral Pulse Rate 51 bpm  LOW    Pulse Site Radial artery    Systolic Blood Pressure 144 mmHg  HI    Diastolic Blood Pressure 74 mmHg    Mean Arterial Pressure 97 mmHg    BP Site Right arm    Oxygen Saturation 97 %      Measurements from flowsheet : Measurements   10/11/2018 8:11 AM CDT Height Measured - Standard 69 in    Weight Measured - Standard 136.4 lb    BSA 1.73 m2    Body Mass Index 20.14 kg/m2      General:  Alert and oriented, No acute distress.    Eye:  Pupils are equal, round and reactive to light, Normal conjunctiva.    HENT:  Oral mucosa is moist.    Neck:  Supple, No lymphadenopathy.    Respiratory:  Lungs are clear to auscultation, Respirations are non-labored.    Cardiovascular:  Normal rate, Regular rhythm, No murmur, No edema.    Gastrointestinal:  Soft, Non-tender, Non-distended.    Musculoskeletal:  Normal gait.    Integumentary:  Warm, No rash.    Neurologic:  Alert, Oriented.    Psychiatric:  Cooperative, Appropriate mood & affect, Normal judgment.       Review / Management   Results review      Impression and Plan       Diagnosis     Hypertension (RZY89-SY I10).     Atrial fibrillation with RVR (EGB87-TB  "I48.91).     Peripheral vascular disease (TTC85-IX I73.9).     History of transient ischemic attack (TIA) (VSS31-XT Z86.73).     Plan:  Will refill medications to [facility] in [place].     Reffered to new cardiologist due to insurance reasons.     Up to date on Tetanus vaccine last one was in 2011, up to date on pneumonia vaccines, also have had one Shingles shot. There is a new Shingles shot, we currently do not have in stock, should check with pharmacy if you would like to get the shot.     [facility] work done today[facility]     Encouraged to try to get some daily excercise and stretching to stay active and be able to move around without issues. .    Summary:  Declines Flu vaccine, he has had both Pneumonia vaccine and a Shingles vaccine, he is also up to date on Tdap \"2011\"     Declines Colon Cancer Screening-he declines Colonoscopy    Does not want another PSA test, had one in past and does not feel he needs to repeat.     Lab work done today: TSH/BMP/CBC/UA/HgA1C.    I, Raquel Amezcua Medical Assistant acted solely as a scribe for, and in presence of Dr. Luis Hines who performed the services.     Diagnosis     Chronic kidney disease (CKD), stage III (moderate) (MPW81-JN N18.3).     Dyslipidemia (LZF46-YO E78.5).     TOBACCO USE DISORDER (DIT08-LR Z72.0).     Plan:  He has decreased his smoking down to one pack per week, encouraged he stop overall.     Due to Chronic Kidney disease would like him to be seen every six months rather than every year, he verbalizes understanding of this. .  "

## 2022-02-16 NOTE — TELEPHONE ENCOUNTER
Entered by Fredo Garza on September 24, 2019 9:21:10 AM CDT  ---------------------  From: Fredo Garza   To: [facility] [place] [id number]    Sent: 9/24/2019 9:21:10 AM CDT  Subject: Medication Management     ** Submitted: **  Order:simvastatin (simvastatin 40 mg oral tablet)  1 tab(s)  Oral  qhs  Qty:  30 tab(s)        Refills:  0          Substitutions Allowed     Route To [facility] [place] #[id number]    Signed by Fredo Garza  9/24/2019 9:20:00 AM    ** Submitted: **  Complete:simvastatin (simvastatin 40 mg oral tablet)   Signed by Fredo Garza  9/24/2019 9:21:00 AM    ** Not Approved:  **  simvastatin (SIMVASTATIN 40MG TABLETS)  TAKE 1 TABLET BY MOUTH AT BEDTIME  Qty:  90 tab(s)        Days Supply:  90        Refills:  0          Substitutions Allowed     Route To [facility] [place] [facility][id number]   Signed by Fredo Garza            ** Submitted: **  Order:metoprolol (Metoprolol Succinate ER 25 mg oral tablet, extended release)  1 tab(s)  Oral  daily  Need appoinment for further refills.  Qty:  30 tab(s)        Refills:  0          Substitutions Allowed     Route To [facility] [place] #[id number]    Signed by Fredo Garza  9/24/2019 9:20:00 AM    ** Submitted: **  Complete:metoprolol (metoprolol succinate 25 mg oral capsule, extended release)   Signed by Fredo Garza  9/24/2019 9:20:00 AM    ** Not Approved:  **  metoprolol (METOPROLOL ER SUCCINATE 25MG TABS)  TAKE 1 TABLET BY MOUTH DAILY  Qty:  90 tab(s)        Days Supply:  90        Refills:  0          Substitutions Allowed     Route To [facility] #[id number]   Signed by Fredo Garza            ** Submitted: **  Order:hydrochlorothiazide-triamterene (hydrochlorothiazide-triamterene 50 mg-75 mg oral tablet)  1 tab(s)  Oral  daily  Need appoitnment for further refills.  Qty:  30 tab(s)        Refills:  0           Substitutions Allowed     Route To [facility] #[id number]    Signed by Fredo Garza  9/24/2019 9:18:00 AM    ** Submitted: **  Complete:hydrochlorothiazide-triamterene (hydrochlorothiazide-triamterene 50 mg-75 mg oral tablet)   Signed by Fredo Garza  9/24/2019 9:20:00 AM    ** Not Approved:  **  hydrochlorothiazide-triamterene (TRIAMTERENE 75MG/ HCTZ 50MG TABLETS)  TAKE 1 TABLET BY MOUTH DAILY  Qty:  90 tab(s)        Days Supply:  90        Refills:  0          Substitutions Allowed     [email or web address] [place] [id number]   Signed by Fredo Garza            ------------------------------------------  From: [facility] [place] [facility] #[id number]  To: Luis Hines MD  Sent: September 21, 2019 5:01:19 AM CDT  Subject: Medication Management  Due: September 22, 2019 5:01:19 AM CDT    ** On Hold Pending Signature **  Drug: hydrochlorothiazide-triamterene (hydrochlorothiazide-triamterene 50 mg-75 mg oral tablet)  1 TAB(S) ORAL DAILY  Quantity: 90 tab(s)     Days Supply: 0         Refills: 0  Substitutions Allowed  Notes from Pharmacy: Pt has supply at home please hold until he calls for refills    Dispensed Drug: hydrochlorothiazide-triamterene (hydrochlorothiazide-triamterene 50 mg-75 mg oral tablet)  TAKE 1 TABLET BY MOUTH DAILY  Quantity: 90 tab(s)     Days Supply: 90        Refills: 0  Substitutions Allowed  Notes from Pharmacy:     ** On Hold Pending Signature **  Drug: metoprolol (Kapspargo Sprinkle 25 mg oral capsule, extended release)  1 CAP(S) ORAL DAILY  Quantity: 90 cap(s)     Days Supply: 0         Refills: 0  Substitutions Allowed  Notes from Pharmacy: Pt has supply at home please keep on hold until he calls and requests refills    Dispensed Drug: metoprolol (Metoprolol Succinate ER 25 mg oral tablet, extended release)  TAKE 1 TABLET BY MOUTH DAILY  Quantity: 90 tab(s)     Days Supply: 90        Refills: 0  Substitutions Allowed  Notes from  Pharmacy:     ** On Hold Pending Signature **  Drug: simvastatin (simvastatin 40 mg oral tablet)  TAKE 1 TABLET BY MOUTH AT BEDTIME  Quantity: 90 tab(s)     Days Supply: 90        Refills: 0  Substitutions Allowed  Notes from Pharmacy:     Dispensed Drug: simvastatin (simvastatin 40 mg oral tablet)  TAKE 1 TABLET BY MOUTH AT BEDTIME  Quantity: 90 tab(s)     Days Supply: 90        Refills: 0  Substitutions Allowed  Notes from Pharmacy:   ------------------------------------------Called pt to remind him that he is due for an appointment. He agreed. Sent a one month supply for each of his meds.   He will call back this week to schedule. He had no further questions or concerns.

## 2022-02-16 NOTE — TELEPHONE ENCOUNTER
Entered by Erika Woody MA on May 08, 2019 1:07:02 PM CDT  ---------------------  From: Erika Woody MA   To: [facility] [place] [address]    Sent: 5/8/2019 1:07:02 PM CDT  Subject: Medication Management     ** Submitted: **  Order:lisinopril (lisinopril 2.5 mg oral tablet)  1 tab(s)  Oral  daily  Qty:  90 tab(s)        Days Supply:  30        Refills:  0          Substitutions Allowed     Route To [facility] [place] [address]    Signed by Erika Woody MA  5/8/2019 1:06:00 PM    ** Submitted: **  Complete:lisinopril (lisinopril 2.5 mg oral tablet)   Signed by Erika Woody MA  5/8/2019 1:06:00 PM    ** Not Approved:  **  lisinopril (LISINOPRIL 2.5MG TABLETS)  TAKE 1 TABLET BY MOUTH DAILY  Qty:  90 tab(s)        Days Supply:  30        Refills:  1          Substitutions Allowed     Route To [facility] [place] [address]   Note from Pharmacy:  **Patient requests 90 days supply**  Signed by Erika Woody MA            ------------------------------------------  From: [facility] [place] [address]  To: Luis Hines MD  Sent: May 7, 2019 3:33:02 PM CDT  Subject: Medication Management  Due: May 8, 2019 3:33:02 PM CDT    ** On Hold Pending Signature **  Drug: lisinopril (lisinopril 2.5 mg oral tablet)  1 TAB(S) ORAL DAILY  Quantity: 30 tab(s)     Days Supply: 0         Refills: 2  Substitutions Allowed  Notes from Pharmacy: New medication**Patient requests 90 days supply**    Dispensed Drug: lisinopril (lisinopril 2.5 mg oral tablet)  TAKE 1 TABLET BY MOUTH DAILY  Quantity: 90 tab(s)     Days Supply: 30        Refills: 1  Substitutions Allowed  Notes from Pharmacy: **Patient requests 90 days supply**  ------------------------------------------Med Refill      Date of last office visit and reason:  4/15/19 w/ PEGGY for BP f/u      Date of last Med Check / Px:   _  Date of last labs pertaining to med:  _    Note:  pt is to see [facility] for consult before seeing cardio per PEGGY    RTC order in chart:   _    For Protocol refill, has patient been contacted:  _

## 2022-02-16 NOTE — TELEPHONE ENCOUNTER
---------------------  From: Shaunna Lyn LPN ([facility] [facility] ([id number]_[facility]))   To: PEGGY Valera Pool ([address]_[place] [facility] [place]);     Sent: 5/4/2020 10:31:26 AM CDT  Subject: FW: Medication Management   Due Date/Time: 5/3/2020 10:19:00 AM CDT     Med Refill    Date of last office visit and reason:  _10/02/19    Date of last Med Check or Px:  10/02/2019  Date of last labs pertaining to med:  10/02/2019    RTC order in chart:  was due for CDM visit last month    For protocol refill, has patient been contacted?  please advise, telemed, video, or in clinic appt.       ------------------------------------------  From: [facility] #[id number]  To: Luis Hines MD  Sent: May 2, 2020 10:19:53 AM CDT  Subject: Medication Management  Due: May 3, 2020 10:19:53 AM CDT    ** On Hold Pending Signature **  Drug: lisinopril (lisinopril 2.5 mg oral tablet)  TAKE 1 TABLET BY MOUTH DAILY  Quantity: 82 tab(s)  Days Supply: 90  Refills: 0  Substitutions Allowed  Notes from Pharmacy: **Patient requests 90 days supply**    Dispensed Drug: lisinopril (lisinopril 2.5 mg oral tablet)  TAKE 1 TABLET BY MOUTH DAILY  Quantity: 82 tab(s)  Days Supply: 90  Refills: 0  Substitutions Allowed  Notes from Pharmacy: **Patient requests 90 days supply**  ---------------------------------------------------------------  From: Lori Alex CMA ([facility] Message Pool ([id number]))   To: Luis Hines MD;     Sent: 5/4/2020 10:36:37 AM CDT  Subject: FW: Medication Management   Due Date/Time: 5/3/2020 10:19:00 AM CDT---------------------  From: Luis Hines MD   To: [facility] #[id number]    Sent: 5/4/2020 11:19:21 AM CDT  Subject: FW: Medication Management     ** Submitted: **  Complete:lisinopril (lisinopril 2.5 mg oral tablet)   Signed by Luis Hines MD  5/4/2020 4:19:00 PM    ** Approved **  lisinopril (LISINOPRIL 2.5MG TABLETS)  TAKE 1 TABLET BY MOUTH DAILY  Qty:  82 tab(s)        Days Supply:  90         Refills:  0          Substitutions Allowed     Route To Pharmacy - [facility] #[id number]   Note from Pharmacy:  **Patient requests 90 days supply**

## 2022-02-16 NOTE — CARE COORDINATION
Pt appears on ZIM_ chronic disease panel as out of parameters for _HTN/CKD/Tobacco_.  Pt is on list for Tobacco, provider discussed smoking cessation on 9/26/2017.

## 2022-02-16 NOTE — TELEPHONE ENCOUNTER
---------------------  From: Madiha Norwood (Phone Messages [employer] ([address]224_[facility]))   To: PEGGY Message Pool ([address]_[place] [facility] [place]);     Sent: 10/28/2019 10:04:38 AM CDT  Subject: Medications      Phone Message    PCP: PEGGY    Time of Call: 0955    Phone Number: [phone]    Returned call at: 1002    Note:     Patient called stating that he was in to see PEGGY on 10/02. Patient had stated that pt and PEGGY did not go over medications or if he needed any refills. Patient is asking if he can get all of his medications filled at the same time and the same amount of pills otherwise it gets very hectic to him. Patient also stated that PEGGY had referred him to MED and he is confused at who is suppose to be taking care of filling his medications. Patient aware that PEGGY will be in @1300. Patient is expecting a call back.     Pharmacy: [facility]     Last office visit and reason: Chronic Renal Failure 10/02/19    Transferred to: PEGGY Message [place] spoke [place] Nabila. He states he had his consult with KADE and that he did not change anything on his medications and he is not needing to follow up with him at this time. He is wondering if Peggy will refill for him. I informed him that I will send in 6 months of his medications and then he will be due for a visit. Pt states he understands. The refills where sent to [facility] in [place] per his request.

## 2022-02-16 NOTE — PROGRESS NOTES
Patient:   YESSY MACKAY            MRN: [id number]            FIN: [id number]               Age:   70 years     Sex:  Male     :  1950   Associated Diagnoses:   Atrial fibrillation with RVR; Chronic kidney disease (CKD), stage III (moderate); Hypertension; Peripheral vascular disease   Author:   Luis Hines MD      Chief Complaint   2020 9:01 AM CST    f/u recent lab work. verbal consent given for telephone visit        History of Present Illness   telephone visit due to viral pandemic    905 to 925am  long history of vascular disease, chronic renal failure, a fib    he is happy to report feeling well.   he did have a close exposure to covid around [place] but did not seem to catch it.   his only outings now are grocery shopping  had labs 1 week ago also    no changes to his stamina, no new symptoms      Review of Systems   Constitutional:  No fever, No chills.    Eye   Ear/Nose/Mouth/Throat:  No nasal congestion, No sore throat.    Respiratory:  No shortness of breath, No cough.    Cardiovascular   Breast   Gastrointestinal:  No nausea, No vomiting, No diarrhea, No constipation.    Genitourinary:  No dysuria.    Gynecologic   Hematology/Lymphatics:  No bruising tendency, No swollen lymph glands.    Endocrine   Immunologic:  No recurrent fevers, No recurrent infections.    Musculoskeletal:  No muscle pain.    Integumentary:  No rash.    Neurologic:  No tingling, No headache.    Psychiatric   All other systems.     Health Status   Allergies:    Allergic Reactions (Selected)  Severity Not Documented  Losartan (Wheezing and shortness of breath)  Penicillin (High fever)  Vibramycin (High fever)   Medications:  (Selected)   Prescriptions  Prescribed  Eliquis 2.5 mg oral tablet: = 1 tab(s) ( 2.5 mg ), Oral, bid, # 180 tab(s), 2 Refill(s), Type: Maintenance, Pharmacy: [facility], 1 tab(s) Oral bid,x90 day(s), 69, in, 10/16/19 7:58:00 CDT, Height Measured, 122, lb, 10/16/19 7:58:00  CDT, Weight Measured  hydrochlorothiazide-triamterene 50 mg-75 mg oral tablet: 1 tab(s), Oral, daily, # 90 tab(s), 1 Refill(s), Type: Maintenance, Pharmacy: [facility], Needs appt for further refills, 1 tab(s) Oral daily,x90 day(s), 69, in, 10/16/19 7:58:00 CDT, Height Measured, 122, lb, 10/16/19 7:58:00...  lisinopril 2.5 mg oral tablet: = 1 tab(s), Oral, daily, # 90 tab(s), 1 Refill(s), Type: Soft Stop, Pharmacy: [facility], 1 tab(s) Oral daily, 69, in, 10/16/19 7:58:00 CDT, Height Measured, 122, lb, 10/16/19 7:58:00 CDT, Weight Measured  metoprolol succinate 100 mg oral capsule, extended release: = 1 cap(s) ( 100 mg ), Oral, daily, # 90 cap(s), 1 Refill(s), Type: Maintenance, Pharmacy: [facility], 1 cap(s) Oral daily, 69, in, 10/16/19 7:58:00 CDT, Height Measured, 122, lb, 10/16/19 7:58:00 CDT, Weight Measured  simvastatin 40 mg oral tablet: = 1 tab(s), Oral, qhs, # 90 tab(s), 1 Refill(s), Type: Maintenance, Pharmacy: [facility], Needs appt for further appts, 1 tab(s) Oral qhs,x90 day(s), 69, in, 10/16/19 7:58:00 CDT, Height Measured, 122, lb, 10/16/19 7:58:00 CDT...  Documented Medications  Documented  Titi Back & Body: 2 tab(s), Oral, daily, PRN: as needed for pain, 0 Refill(s), Type: Maintenance  Vitamin D with Minerals oral tablet: 1 tab(s), po, daily, 0 Refill(s), Type: Maintenance  aspirin 81 mg oral delayed release tablet: = 1 tab(s) ( 81 mg ), PO, Daily, # 30 tab(s), 0 Refill(s), Type: Maintenance,    Medications          *denotes recorded medication          Eliquis 2.5 mg oral tablet: 2.5 mg, 1 tab(s), Oral, bid, for 90 day(s), 180 tab(s), 2 Refill(s).          *aspirin 81 mg oral delayed release tablet: 81 mg, 1 tab(s), PO, Daily, 30 tab(s), 0 Refill(s).          *Titi Back & Body: 2 tab(s), Oral, daily, PRN: as needed for pain, 0 Refill(s).          hydrochlorothiazide-triamterene 50 mg-75 mg oral  tablet: 1 tab(s), Oral, daily, for 90 day(s), 90 tab(s), 1 Refill(s).          lisinopril 2.5 mg oral tablet: 1 tab(s), Oral, daily, 90 tab(s), 1 Refill(s).          metoprolol succinate 100 mg oral capsule, extended release: 100 mg, 1 cap(s), Oral, daily, 90 cap(s), 1 Refill(s).          *Vitamin D with Minerals oral tablet: 1 tab(s), po, daily.          simvastatin 40 mg oral tablet: 1 tab(s), Oral, qhs, for 90 day(s), 90 tab(s), 1 Refill(s).       Problem list:    All Problems (Selected)  Hypertension / 0302843999 / Confirmed  Amaurosis fugax / 774452286 / Confirmed  TOBACCO USE DISORDER / 305.1 / Confirmed  Peripheral vascular disease / 4361620402 / Confirmed  Dyslipidemia / 0357867270 / Confirmed  Back pain / 934672180 / Confirmed  History of transient ischemic attack (TIA) / 665050307 / Confirmed  Chronic kidney disease (CKD), stage III (moderate) / 7092535447 / Confirmed  Atrial fibrillation with RVR / 2022135053 / Confirmed      Histories   Past Medical History:    Active  Hypertension (9540307926)  Amaurosis fugax (207560137)  TOBACCO USE DISORDER (305.1)  Peripheral vascular disease (7209217567)  Dyslipidemia (9104186381)  Back pain (410764826)  History of transient ischemic attack (TIA) (712965547)  Chronic kidney disease (CKD), stage III (moderate) (5768845594)  Atrial fibrillation with RVR (1132563263)  Resolved  Inpatient stay (387642414): Onset on 10/25/2017 at 67 years.  Resolved on 10/26/2017 at 67 years.  Comments:  11/6/2017 CST 10:40 AM CST - Radha Moran  @[place], [place] - Atrial fibrillation with RVR, aortic dissection   Family History:    No family history items have been selected or recorded.   Procedure history:    Echocardiogram (SNOMED CT 1006309962) on 10/25/2017 at 67 Years.  Comments:  11/1/2017 12:34 PM CDT - Raquel Amezcua MA  ejection fraction of 55-60%.  Dissecting thoracic aneurysm on 11/20/2003 at 53 Years.  Patch angioplasty of artery (SNOMED CT  227351858) on 11/20/2003 at 53 Years.   Social History:        Electronic Cigarette/Vaping Assessment            Electronic Cigarette Use: Never.      Alcohol Assessment            Current      Tobacco Assessment: Current            10 or more cigarettes (1/2 pack or more)/day in last 30 days        Physical Examination   Neurologic:  Alert, Oriented.    Psychiatric:  Cooperative, Appropriate mood & affect, Normal judgment.       Review / Management   Results review:  Lab results   12/2/2020 9:24 AM CST Sodium Level 138 mmol/L    Potassium Level 5.3 mmol/L    Chloride Level 116 mmol/L  HI    CO2 Level 15 mmol/L  LOW    Glucose Level 83 mg/dL    BUN 54 mg/dL  HI    Creatinine Level 2.11 mg/dL  HI    BUN/Creat Ratio 26  HI    eGFR 31 mL/min/1.73m2  LOW    eGFR African American 36 mL/min/1.73m2  LOW    Calcium Level 9.1 mg/dL    Cholesterol 130 mg/dL    Non-HDL Cholesterol 88    HDL 42 mg/dL    Cholesterol/HDL Ratio 3.1    LDL 70    Triglyceride 96 mg/dL    WBC 10.9  HI    RBC 4.19  LOW    Hgb 12.7 gm/dL  LOW    Hct 38.9 %    MCV 92.8 fL    MCH 30.3 pg    MCHC 32.6 gm/dL    RDW 13.5 %    Platelet 198    MPV 10.2 fL   .       Impression and Plan   Diagnosis     Atrial fibrillation with RVR (IRZ41-NZ I48.91).     Chronic kidney disease (CKD), stage III (moderate) (ESF90-JN N18.30).     Hypertension (VMD37-BP I10).     Peripheral vascular disease (ZJN18-CV I73.9).     Orders     Orders (Selected)   Prescriptions  Prescribed  Eliquis 2.5 mg oral tablet: = 1 tab(s) ( 2.5 mg ), Oral, bid, # 180 tab(s), 2 Refill(s), Type: Maintenance, Pharmacy: [facility], 1 tab(s) Oral bid,x90 day(s), 69, in, 10/16/19 7:58:00 CDT, Height Measured, 122, lb, 10/16/19 7:58:00 CDT, Weight Measured  hydrochlorothiazide-triamterene 50 mg-75 mg oral tablet: 1 tab(s), Oral, daily, # 90 tab(s), 1 Refill(s), Type: Maintenance, Pharmacy: [facility], Needs appt for further refills, 1 tab(s) Oral  daily,x90 day(s), 69, in, 10/16/19 7:58:00 CDT, Height Measured, 122, lb, 10/16/19 7:58:00...  lisinopril 2.5 mg oral tablet: = 1 tab(s), Oral, daily, # 90 tab(s), 1 Refill(s), Type: Soft Stop, Pharmacy: [facility] license of [facility], 1 tab(s) Oral daily, 69, in, 10/16/19 7:58:00 CDT, Height Measured, 122, lb, 10/16/19 7:58:00 CDT, Weight Measured  metoprolol succinate 100 mg oral capsule, extended release: = 1 cap(s) ( 100 mg ), Oral, daily, # 90 cap(s), 1 Refill(s), Type: Maintenance, Pharmacy: On license of [facility], 1 cap(s) Oral daily, 69, in, 10/16/19 7:58:00 CDT, Height Measured, 122, lb, 10/16/19 7:58:00 CDT, Weight Measured  simvastatin 40 mg oral tablet: = 1 tab(s), Oral, qhs, # 90 tab(s), 1 Refill(s), Type: Maintenance, Pharmacy: On license of [facility], Needs appt for further appts, 1 tab(s) Oral qhs,x90 day(s), 69, in, 10/16/19 7:58:00 CDT, Height Measured, 122, lb, 10/16/19 7:58:00 CDT....

## 2022-02-16 NOTE — TELEPHONE ENCOUNTER
---------------------  From: Raquel Amezcua MA   To: Raquel Amezcua MA;     Sent: 4/29/2019 7:06:51 PM CDT  Subject: [facility] Consult      Call patient, let him know cardiolgist Dr. Partida has reached out to us and he has reccommended that patient have a consult with Dr. Rodriguez to discuss his kidney function, and also to discuss how he should monitor his Thoracic and Abdominal Aorta.---------------------  From: Raquel Amezcua MA   To: Luis Hines MD;     Sent: 5/3/2019 9:37:01 AM CDT  Subject: FW: [facility] Consult      FYI on patient,     I discussed below with patient, he informed me that there is a test they do to check kidneys and it inquires Dye to go into kidneys and patient does not want to do this, he has informed Dr. Partida that he is not interested in this as he believes he can get possible infection from it and also believes it will injure his kidneys further. I informed him that the consult with [facility] would be to discuss options regarding his kidney failure and included that [facility]M may just say to monitor kidney function if patient declines further testing of kidneys. Also discussed the options for monitoring Thoracic and abdominal aorta. Patient understood. I told patient he is just fine to think about the consult and if he decides he can call clinic to set up consult with [facility]. Patient understood and he will think about this and will call if he decides he will go ahead with consult. He just wanted me to make sure you are aware of this situation.     Time of call: 9:36am    Thank you    Raquel LESTER CMA

## 2022-02-16 NOTE — PROGRESS NOTES
Chief Complaint    Med check  History of Present Illness      69-year-old is here discuss his health.      Feels like he is doing well.  He gets out to do some shopping.  Says he can walk down 2 flights of stairs and about 100 yards before he gets very tired.  Says his back pain limits his activities more than breathing he has not had any chest pains or pressure.       Patient has a known thoracic aneurysm he is due to have it scanned by CT scan however he has significant chronic renal failure.  Review of Systems      No headaches, no vision changes      No rashes, no tingling       No chest pressure, no palpitations       No shortness of breath       He does have chronic low back pain that he thinks has as manageable  Physical Exam   Vitals & Measurements    HR: 74(Peripheral)  BP: 122/64     HT: 69 in  WT: 121.6 lb  BMI: 17.96       Thin very congenial patient breathing is nonlabored       No chest wall tenderness       No peripheral edema       Good gait and good strength although he is careful at times and admits there is a little imbalance  Assessment/Plan       1. Atrial fibrillation with RVR (I48.91)         He is anticoagulated he has not had any tachycardias.  Of note he does measure his heart rate and notes he ran 70 and if he carries out the trash and walks a long ways to get up to 125 for a few minutes and then settles down                2. Back pain (M54.9)         Is hurts and it bothers him but is not really limiting factor at this time I think any exercise he does to improve his core strength is likely to help                3. Thoracic aortic aneurysm (I71.2)         Ideally we do a CT scan with IV contrast                Abnormal thyroid stimulating hormone (TSH) level (R79.89)         Due to have a thyroid checked there is a mild elevation on last check                Chronic kidney disease (CKD), stage III (moderate) (N18.3)         He is agreed to see our nephrologist and discuss whether he  could tolerate a CT with dye and what arrangements we would make around that to minimize his risk if he is a candidate         Ordered:          Referral (Request), 10/02/19 8:42:00 CDT, Referred to: Nephrology, Referred to: Dr Rodriguez nephrology. advise on --can patient have dye for CT of chest aneurysm follow, Chronic kidney disease (CKD), stage III (moderate)          Referral - Internal (Request), 10/02/19 8:51:00 CDT, Referred to: Internal Medicine, Referred to: Fabricio Rodriguez, Hypertension  Dyslipidemia  Chronic kidney disease (CKD), stage III (moderate)  Renal failure                Dyslipidemia (E78.5)         Ordered:          Referral - Internal (Request), 10/02/19 8:51:00 CDT, Referred to: Internal Medicine, Referred to: Fabricio Rodriguez, Hypertension  Dyslipidemia  Chronic kidney disease (CKD), stage III (moderate)  Renal failure                Hypertension (I10)         Ordered:          Referral - Internal (Request), 10/02/19 8:51:00 CDT, Referred to: Internal Medicine, Referred to: Fabricio Rodriguez, Hypertension  Dyslipidemia  Chronic kidney disease (CKD), stage III (moderate)  Renal failure                Orders:         Basic Metabolic Panel* ([facility]), Specimen Type: Serum, Collection Date: 10/02/19 8:46:00 CDT         CBC (h/h, RBC, indices, WBC, Plt)* ([facility]), Specimen Type: Blood, Collection Date: 10/02/19 8:46:00 CDT         Lipid panel with reflex to direct ldl* ([facility]), Specimen Type: Serum, Collection Date: 10/02/19 8:46:00 CDT         PTH, Intact And Calcium* ([facility]), Specimen Type: Serum, Collection Date: 10/02/19 8:46:00 CDT         Return to Clinic (Request), RFV: Chronic disease follow up, Return in 6 months         Return to Clinic (Request), RFV: SO: cbc q 6-12 months with annual labs         Return to Clinic (Request), RFV: Chem 8, Return in 2 months         Return to Clinic (Request), RFV: Chronic Disease f/u, Return in 6 months         T4, free* ([facility]), Specimen Type: Serum, Collection  Date: 10/02/19 8:46:00 CDT         TSH* ([facility]), Specimen Type: Serum, Collection Date: 10/02/19 8:46:00 CDT         Urinalysis Reflex* ([facility]), Specimen Type: Urine, Collection Date: 10/02/19 8:46:00 CDT  Patient Information     Name:YESSY MACKAY      Address:      [address] [place] [address] [place] [address]     Sex:Male     YOB: 1950     [phone]     Emergency Contact:IVONNE ZAMUDIO     MRN:[id number]     FIN:[id number]     Location:[facility]     Date of Service:10/02/2019      Primary Care Physician:       Luis Hines MD, [phone]      Attending Physician:       Luis Hines MD, [phone]  Problem List/Past Medical History    Ongoing     Amaurosis fugax     Atrial fibrillation with RVR     Back pain     Chronic kidney disease (CKD), stage III (moderate)     Dyslipidemia     History of transient ischemic attack (TIA)     Hypertension     Peripheral vascular disease     TOBACCO USE DISORDER    Historical     Inpatient stay       Comments: @[place], [place] - Atrial fibrillation with RVR, aortic dissection  Procedure/Surgical History     Echocardiogram (10/25/2017)      Comments: ejection fraction of 55-60%..     Dissecting thoracic aneurysm (11/20/2003)     Patch angioplasty of artery (11/20/2003)  Medications    aspirin 81 mg oral delayed release tablet, 81 mg= 1 tab(s), Oral, daily    hydrochlorothiazide-triamterene 50 mg-75 mg oral tablet, 1 tab(s), Oral, daily    ibuprofen    lisinopril 2.5 mg oral tablet, 1 tab(s), Oral, daily    Metoprolol Succinate ER 25 mg oral tablet, extended release, 1 tab(s), Oral, daily    simvastatin 40 mg oral tablet, 1 tab(s), Oral, qhs    Vitamin D with Minerals oral tablet, 1 tab(s), Oral, daily  Allergies    Vibramycin (High Fever)    losartan (Wheezing, Shortness of breath)    penicillin (High Fever)  Social History    Smoking Status - 10/02/2019     Current every day smoker      Alcohol      Current, 08/15/2011     Tobacco - Current, 08/16/2010  Immunizations      Vaccine Date Status      pneumococcal (PCV13) 09/26/2017 Given      pneumococcal (PPSV23) 09/19/2016 Given      ZOS, shingles 09/10/2012 Recorded      tetanus/diphth/pertuss (Tdap) adult/adol 08/16/2011 Given      Td 01/01/1999 Recorded

## 2022-02-16 NOTE — PROGRESS NOTES
Patient:   YESSY MACKAY            MRN: [id number]            FIN: [id number]               Age:   67 years     Sex:  Male     :  1950   Associated Diagnoses:   Dyslipidemia; Hypertension; Peripheral Vascular Disease   Author:   Luis Hines MD      Chief Complaint   2017 8:49 AM CDT    Med check      History of Present Illness   see chief complaint as noted above and confirmed with the patient   67 year old male presenting for a medication check up.  HTN: The patient takes his medications regularly.  He is unaware of any hypertension.   He denies flushing, lightheadedness, or any dizziness.    Dyslipidemia: The patient is taking his medications regularly.  He denies any muscle aches or cramps.   PVD: The patient has not had any transient ischemic attacks specifically.  He has never had return of his amaurosis fugax he had several years ago.  He is not having claudication.  The patient one year ago did have an echocardiogram that showed moderate aortic regurgitation and mild dilation of the ascending aorta.    The patient has a double pulse in the left carotid.  There is no bruit heard in the left or right carotid.           Review of Systems   Constitutional:  No fever, No chills.    Eye   Ear/Nose/Mouth/Throat:  No nasal congestion, No sore throat.    Respiratory:  No shortness of breath, No cough.    Cardiovascular   Breast   Gastrointestinal:  No nausea, No vomiting, No diarrhea, No constipation.    Genitourinary:  No dysuria.    Gynecologic   Hematology/Lymphatics:  No bruising tendency, No swollen lymph glands.    Endocrine   Immunologic:  No recurrent fevers, No recurrent infections.    Musculoskeletal:  No muscle pain.    Integumentary:  No rash.    Neurologic:  No tingling, No headache.    Psychiatric             Health Status   Allergies:    Allergic Reactions (Selected)  Severity Not Documented  Penicillin (High fever)  Vibramycin (High fever)   Medications:  (Selected)    Prescriptions  Prescribed  NIFEdipine 90 mg oral tablet, extended release: See Instructions, Instructions: Take one tablet by mouth one time daily, # 30 tab(s), 0 Refill(s), Type: Maintenance, Pharmacy: [id number], Pt to be seen by provider for further refills.  hydrochlorothiazide-triamterene 50 mg-75 mg oral tablet: See Instructions, Instructions: Take one tablet by mouth one time daily, # 30 tab(s), 0 Refill(s), Type: Maintenance, Pharmacy: [id number], Pt to be seen by provider for further refills.  simvastatin 40 mg oral tablet: See Instructions, Instructions: Take one tablet by mouth   nightly at bedtime, # 30 tab(s), 0 Refill(s), Type: Maintenance, Pharmacy: [id number], Pt to be seen by provider for further refills.  Documented Medications  Documented  Vitamin D with Minerals oral tablet: 1 tab(s), po, daily, 0 Refill(s), Type: Maintenance  aspirin: ( 81 mg ), 0 Refill(s)  ibuprofen: Instructions: as needed, 0 Refill(s), Type: Maintenance   Problem list:    All Problems  TOBACCO USE DISORDER / ICD-9-.1 / Confirmed  Peripheral Vascular Disease / ICD-9-.9 / Confirmed  Hypertension / SNOMED CT 5288856727 / Confirmed  History of transient ischemic attack (TIA) / ICD-9-CM V12.54 / Confirmed  Dyslipidemia / ICD-9-.4 / Confirmed  Back pain / ICD-9-.5 / Confirmed  Amaurosis fugax / SNOMED CT 746769198 / Confirmed  Canceled: thoracic artery dissection      Histories   Past Medical History:    Active  Hypertension (7190630014)  Amaurosis fugax (722440650)  TOBACCO USE DISORDER (305.1)  Peripheral Vascular Disease (443.9)  Dyslipidemia (272.4)  Back pain (724.5)  History of transient ischemic attack (TIA) (V12.54)   Family History:    No family history items have been selected or recorded.   Procedure history:    Dissecting thoracic aneurysm on 11/20/2003 at 53 Years.  Patch angioplasty of artery (420217432) on 11/20/2003 at 53 Years.   Social History:         Alcohol Assessment            Current      Tobacco Assessment: Current        Physical Examination   Vital Signs   9/26/2017 8:49 AM CDT Peripheral Pulse Rate 74 bpm    Systolic Blood Pressure 112 mmHg    Diastolic Blood Pressure 60 mmHg    Mean Arterial Pressure 77 mmHg      Measurements from flowsheet : Measurements   9/26/2017 8:49 AM CDT Height Measured - Standard 69 in    Weight Measured - Standard 127 lb    BSA 1.67 m2    Body Mass Index 18.75 kg/m2      General:  Alert and oriented, No acute distress.    Eye:  Pupils are equal, round and reactive to light, Normal conjunctiva.    HENT:  Oral mucosa is moist.    Neck:  Supple, No lymphadenopathy.    Respiratory:  Lungs are clear to auscultation, Respirations are non-labored.    Cardiovascular:  Normal rate, Regular rhythm, No edema.    Gastrointestinal:  Non-distended.    Musculoskeletal:  Normal gait.    Integumentary:  Warm, No rash.    Psychiatric:  Cooperative, Appropriate mood & affect, Normal judgment.       Review / Management   Results review      Impression and Plan   Diagnosis     Dyslipidemia (MYB82-HA E78.5).     Hypertension (HYM15-DL I10).     Peripheral Vascular Disease (CGK81-QQ I73.9).     Course:  The patient states he feels terrific and really is not having any new issues.  He hopes to continue like he presently feels.  I do think he needs to have an echo because of his aortic stenosis and prominent findings with his pulse.  He has agreed to do that.    He has declined any look at his carotid arteries with his history of TIA.  He does not want to proceed a with flu shot.    He declines lung cancer screening.  I encouraged him to quit smoking.  I refilled his medications.    Plan:  I, [facility], acted solely as a scribe for, and in the presence of Dr. Luis Hines who performed the service..

## 2022-02-16 NOTE — TELEPHONE ENCOUNTER
Entered by Jacquelin Pace CMA on 2021 1:38:53 PM CDT  ---------------------  From: Jacquelin Pace CMA   To: [facility]    Sent: 2021 1:38:53 PM CDT  Subject: Medication Management     ** Not Approved: Refill not appropriate, pt  **  simvastatin (SIMVASTATIN 40MG TABS)  TAKE ONE TABLET BY MOUTH ONCE EVERY DAY AT BEDTIME  Qty:  90 unknown unit        Days Supply:  90        Refills:  1          Substitutions Allowed     Route To Pharmacy - [facility]   Signed by Jacquelin Pace CMA            ------------------------------------------  From: [facility]  To: Luis Hines MD  Sent: 2021 11:11:45 AM CDT  Subject: Medication Management  Due: 2021 8:25:53 PM CDT     ** On Hold Pending Signature **     Drug: simvastatin (simvastatin 40 mg oral tablet), TAKE ONE TABLET BY MOUTH ONCE EVERY DAY AT BEDTIME  Quantity: 90 unknown unit  Days Supply: 90  Refills: 0  Substitutions Allowed  Notes from Pharmacy:     Dispensed Drug: simvastatin (simvastatin 40 mg oral tablet), TAKE ONE TABLET BY MOUTH ONCE EVERY DAY AT BEDTIME  Quantity: 90 unknown unit  Days Supply: 90  Refills: 1  Substitutions Allowed  Notes from Pharmacy:  ------------------------------------------

## 2022-02-16 NOTE — NURSING NOTE
Comprehensive Intake Entered On:  1/22/2019 10:34 AM CST    Performed On:  1/22/2019 10:27 AM CST by Alize Vigil LPN               Summary   Chief Complaint :   Patient is here for cardio follow up.    Weight Measured :   132.0 lb(Converted to: 132 lb 0 oz, 59.87 kg)    Systolic Blood Pressure :   142 mmHg (HI)    Diastolic Blood Pressure :   60 mmHg   Mean Arterial Pressure :   87 mmHg   Peripheral Pulse Rate :   58 bpm (LOW)    BP Site :   Right arm   Pulse Site :   Radial artery   BP Method :   Manual   HR Method :   Manual   Alize Vigil LPN - 1/22/2019 10:27 AM CST   Health Status   Allergies Verified? :   Yes   Medication History Verified? :   Yes   Immunizations Current :   Yes   Pre-Visit Planning Status :   Completed   Tobacco Use? :   Current every day smoker   Alize Vigil LPN - 1/22/2019 10:27 AM CST   Consents   Consent for Immunization Exchange :   Consent Granted   Consent for Immunizations to Providers :   Consent Granted   Alize Vigil LPN - 1/22/2019 10:27 AM CST   Meds / Allergies   (As Of: 1/22/2019 10:34:56 AM CST)   Allergies (Active)   penicillin  Estimated Onset Date:   Unspecified ; Reactions:   High Fever ; Created By:   Kasie Dumont; Reaction Status:   Active ; Category:   Drug ; Substance:   penicillin ; Type:   Allergy ; Updated By:   Kasie Dumont; Reviewed Date:   1/22/2019 10:31 AM CST      Vibramycin  Estimated Onset Date:   Unspecified ; Reactions:   High Fever ; Created By:   Kasie Dumont; Reaction Status:   Active ; Substance:   Vibramycin ; Updated By:   Kasie Dumont; Reviewed Date:   1/22/2019 10:31 AM CST        Medication List   (As Of: 1/22/2019 10:34:56 AM CST)   Prescription/Discharge Order    simvastatin  :   simvastatin ; Status:   Prescribed ; Ordered As Mnemonic:   simvastatin 40 mg oral tablet ; Simple Display Line:   40 mg, 1 tab(s), po, hs, for 90 day(s), 90 tab(s), 3 Refill(s) ; Ordering Provider:   Luis Hines MD; Catalog Code:   simvastatin ;  Order Dt/Tm:   10/11/2018 8:54:38 AM          hydrochlorothiazide-triamterene  :   hydrochlorothiazide-triamterene ; Status:   Prescribed ; Ordered As Mnemonic:   hydrochlorothiazide-triamterene 50 mg-75 mg oral tablet ; Simple Display Line:   1 tab(s), po, daily, for 90 day(s), 90 tab(s), 3 Refill(s) ; Ordering Provider:   Luis Hines MD; Catalog Code:   hydrochlorothiazide-triamterene ; Order Dt/Tm:   10/11/2018 8:54:55 AM            Home Meds    multivitamin with minerals  :   multivitamin with minerals ; Status:   Documented ; Ordered As Mnemonic:   Vitamin D with Minerals oral tablet ; Simple Display Line:   1 tab(s), po, daily ; Catalog Code:   multivitamin with minerals ; Order Dt/Tm:   10/30/2014 8:04:05 AM          metoprolol  :   metoprolol ; Status:   Processing ; Ordered As Mnemonic:   metoprolol succinate 50 mg oral tablet, extended release ; Simple Display Line:   0.5, po, daily, 0 Refill(s) ; Action Display:   Modify ; Catalog Code:   metoprolol ; Order Dt/Tm:   1/22/2019 10:32:23 AM          ibuprofen  :   ibuprofen ; Status:   Documented ; Ordered As Mnemonic:   ibuprofen ; Simple Display Line:   as needed, 0 Refill(s) ; Catalog Code:   ibuprofen ; Order Dt/Tm:   9/19/2016 9:10:50 AM          aspirin  :   aspirin ; Status:   Documented ; Ordered As Mnemonic:   aspirin 81 mg oral delayed release tablet ; Simple Display Line:   81 mg, 1 tab(s), PO, Daily, 30 tab(s), 0 Refill(s) ; Catalog Code:   aspirin ; Order Dt/Tm:   10/11/2018 8:14:17 AM

## 2022-02-16 NOTE — NURSING NOTE
Comprehensive Intake Entered On:  12/9/2020 9:07 AM CST    Performed On:  12/9/2020 9:01 AM CST by Evelin Farley CMA               Summary   Chief Complaint :   f/u recent lab work. verbal consent given for telephone visit   Evelin Farley CMA - 12/9/2020 9:01 AM CST   Health Status   Allergies Verified? :   Yes   Medication History Verified? :   Yes   Immunizations Current :   Yes   Pre-Visit Planning Status :   Completed   Tobacco Use? :   Current every day smoker   Evelin Farley CMA - 12/9/2020 9:01 AM CST   Consents   Consent for Immunization Exchange :   Consent Granted   Consent for Immunizations to Providers :   Consent Granted   Evelin Farley CMA - 12/9/2020 9:01 AM CST   Meds / Allergies   (As Of: 12/9/2020 9:07:10 AM CST)   Allergies (Active)   losartan  Estimated Onset Date:   Unspecified ; Reactions:   Wheezing, Shortness of breath ; Created By:   Evelin Farley CMA; Reaction Status:   Active ; Category:   Drug ; Substance:   losartan ; Type:   Allergy ; Updated By:   Evelin Farley CMA; Reviewed Date:   2/27/2019 8:36 AM CST      penicillin  Estimated Onset Date:   Unspecified ; Reactions:   High Fever ; Created By:   Kasie Dumont CMA; Reaction Status:   Active ; Category:   Drug ; Substance:   penicillin ; Type:   Allergy ; Updated By:   Kasie Dumont CMA; Reviewed Date:   1/22/2019 10:31 AM CST      Vibramycin  Estimated Onset Date:   Unspecified ; Reactions:   High Fever ; Created By:   Kasie Dumont CMA; Reaction Status:   Active ; Substance:   Vibramycin ; Updated By:   Kasie Dumont CMA; Reviewed Date:   1/22/2019 10:31 AM CST        Medication List   (As Of: 12/9/2020 9:07:10 AM CST)   Prescription/Discharge Order    apixaban  :   apixaban ; Status:   Prescribed ; Ordered As Mnemonic:   Eliquis 2.5 mg oral tablet ; Simple Display Line:   2.5 mg, 1 tab(s), Oral, bid, for 90 day(s), 180 tab(s), 2 Refill(s) ; Ordering Provider:   Luis Hines MD;  Catalog Code:   apixaban ; Order Dt/Tm:   5/12/2020 9:56:26 AM CDT          hydrochlorothiazide-triamterene  :   hydrochlorothiazide-triamterene ; Status:   Prescribed ; Ordered As Mnemonic:   hydrochlorothiazide-triamterene 50 mg-75 mg oral tablet ; Simple Display Line:   1 tab(s), Oral, daily, 30 tab(s), 0 Refill(s) ; Ordering Provider:   Luis Hines MD; Catalog Code:   hydrochlorothiazide-triamterene ; Order Dt/Tm:   10/24/2020 8:01:18 AM CDT          lisinopril  :   lisinopril ; Status:   Prescribed ; Ordered As Mnemonic:   lisinopril 2.5 mg oral tablet ; Simple Display Line:   1 tab(s), Oral, daily, 90 tab(s), 1 Refill(s) ; Ordering Provider:   Luis Hines MD; Catalog Code:   lisinopril ; Order Dt/Tm:   5/12/2020 9:57:40 AM CDT          metoprolol  :   metoprolol ; Status:   Prescribed ; Ordered As Mnemonic:   metoprolol succinate 100 mg oral capsule, extended release ; Simple Display Line:   100 mg, 1 cap(s), Oral, daily, 90 cap(s), 0 Refill(s) ; Ordering Provider:   Luis Hines MD; Catalog Code:   metoprolol ; Order Dt/Tm:   10/20/2020 2:43:33 PM CDT          simvastatin  :   simvastatin ; Status:   Prescribed ; Ordered As Mnemonic:   simvastatin 40 mg oral tablet ; Simple Display Line:   1 tab(s), Oral, qhs, 30 tab(s), 0 Refill(s) ; Ordering Provider:   Luis Hines MD; Catalog Code:   simvastatin ; Order Dt/Tm:   10/24/2020 8:04:42 AM CDT            Home Meds    aspirin-caffeine  :   aspirin-caffeine ; Status:   Documented ; Ordered As Mnemonic:   [employer] ; Simple Display Line:   2 tab(s), Oral, daily, PRN: as needed for pain, 0 Refill(s) ; Catalog Code:   aspirin-caffeine ; Order Dt/Tm:   10/16/2019 7:57:40 AM CDT          aspirin  :   aspirin ; Status:   Documented ; Ordered As Mnemonic:   aspirin 81 mg oral delayed release tablet ; Simple Display Line:   81 mg, 1 tab(s), PO, Daily, 30 tab(s), 0 Refill(s) ; Catalog Code:   aspirin ; Order Dt/Tm:   10/11/2018 8:14:17 AM  CDT          multivitamin with minerals  :   multivitamin with minerals ; Status:   Documented ; Ordered As Mnemonic:   Vitamin D with Minerals oral tablet ; Simple Display Line:   1 tab(s), po, daily ; Catalog Code:   multivitamin with minerals ; Order Dt/Tm:   10/30/2014 8:04:05 AM CDT            ID Risk Screen   Recent Travel History :   No recent travel   Family Member Travel History :   No recent travel   Other Exposure to Infectious Disease :   Unknown   Evelin Farley CMA - 12/9/2020 9:01 AM CST   Social History   Social History   (As Of: 12/9/2020 9:07:10 AM CST)   Alcohol:        Current   (Last Updated: 8/15/2011 11:17:01 AM CDT by Erika Diamond)          Tobacco:  Current      10 or more cigarettes (1/2 pack or more)/day in last 30 days   (Last Updated: 12/9/2020 9:07:04 AM CST by Evelin Farley CMA)          Electronic Cigarette/Vaping:        Electronic Cigarette Use: Never.   (Last Updated: 12/9/2020 9:07:08 AM CST by Evelin Farley CMA)

## 2022-02-16 NOTE — TELEPHONE ENCOUNTER
---------------------  From: Ghislaine Jean   To: Jesse LUGO, Melody MATHIAS; Donny ORTEGA, Luis; Phone Messages Pool ([id number]_[place] [facility] [place]);     Sent: 12/5/2020 12:47:07 PM CST  Subject: Results Follow Up: Kidney Function     Please review results, Creatinine of 2.11.  Routine labs ordered for previsit, Video visit scheduled with PEGGY on 12/9/20.  Previous Creatinine consistently greater than 2.0.         Results:  Date Result Name Ind Value Ref Range   12/2/2020 9:24 AM Sodium Level  138 mmol/L (135 - 146)   12/2/2020 9:24 AM Potassium Level  5.3 mmol/L (3.5 - 5.3)   12/2/2020 9:24 AM Chloride Level ((H)) 116 mmol/L (98 - 110)   12/2/2020 9:24 AM CO2 Level ((L)) 15 mmol/L (20 - 32)   12/2/2020 9:24 AM Glucose Level  83 mg/dL (65 - 99)   12/2/2020 9:24 AM BUN ((H)) 54 mg/dL (7 - 25)   12/2/2020 9:24 AM Creatinine Level ((H)) 2.11 mg/dL (0.70 - 1.25)   12/2/2020 9:24 AM BUN/Creat Ratio ((H)) 26 (6 - 22)   12/2/2020 9:24 AM eGFR ((L)) 31 mL/min/1.73m2 (> OR = 60 - )   12/2/2020 9:24 AM eGFR  ((L)) 36 mL/min/1.73m2 (> OR = 60 - )   12/2/2020 9:24 AM Calcium Level  9.1 mg/dL (8.6 - 10.3)   12/2/2020 9:24 AM Cholesterol  130 mg/dL ( - <200)   12/2/2020 9:24 AM Non-HDL Cholesterol  88 ( - <130)   12/2/2020 9:24 AM HDL  42 mg/dL (> OR = 40 - )   12/2/2020 9:24 AM Cholesterol/HDL Ratio  3.1 ( - <5.0)   12/2/2020 9:24 AM LDL  70    12/2/2020 9:24 AM Triglyceride  96 mg/dL ( - <150)   12/2/2020 9:24 AM WBC ((H)) 10.9 (3.8 - 10.8)   12/2/2020 9:24 AM RBC ((L)) 4.19 (4.20 - 5.80)   12/2/2020 9:24 AM Hgb ((L)) 12.7 gm/dL (13.2 - 17.1)   12/2/2020 9:24 AM Hct  38.9 % (38.5 - 50.0)   12/2/2020 9:24 AM MCV  92.8 fL (80.0 - 100.0)   12/2/2020 9:24 AM MCH  30.3 pg (27.0 - 33.0)   12/2/2020 9:24 AM MCHC  32.6 gm/dL (32.0 - 36.0)   12/2/2020 9:24 AM RDW  13.5 % (11.0 - 15.0)   12/2/2020 9:24 AM Platelet  198 (140 - 400)   12/2/2020 9:24 AM MPV  10.2 fL (7.5 - 12.5)---------------------  From: Jesse LUGO,  Melody MATHIAS   To: Luis Hines MD; Phone Messages Pool ([phone]_[facility]); Ghislaine Jean;     Sent: 12/5/2020 12:49:21 PM CST  Subject: RE: Results Follow Up: Kidney Function     OK to wait for zim as unchanged

## 2022-02-16 NOTE — TELEPHONE ENCOUNTER
Entered by Fredo Garza on September 25, 2019 9:28:21 AM CDT  ---------------------  From: Fredo Garza   To: [facility] #[id number]    Sent: 9/25/2019 9:28:21 AM CDT  Subject: Medication Management     ** Not Approved: Refill not appropriate, refill sent yesterday. **  metoprolol (METOPROLOL ER SUCCINATE 25MG TABS)  TAKE 1 TABLET BY MOUTH DAILY  Qty:  90 tab(s)        Days Supply:  30        Refills:  0          Substitutions Allowed     Route To [facility] #[id number]   Note from Pharmacy:  **Patient requests 90 days supply**  Signed by Fredo Garza            ** Not Approved: Refill not appropriate, refill sent yesterday **  lisinopril (LISINOPRIL 2.5MG TABLETS)  TAKE 1 TABLET BY MOUTH DAILY  Qty:  90 tab(s)        Days Supply:  30        Refills:  0          Substitutions Allowed     [id number]   Note from Pharmacy:  **Patient requests 90 days supply**  Signed by Fredo Garza            ** Not Approved: Refill not appropriate, Refill sent yesterday. **  hydrochlorothiazide-triamterene (TRIAMTERENE 75MG/ HCTZ 50MG TABLETS)  TAKE 1 TABLET BY MOUTH DAILY  Qty:  90 tab(s)        Days Supply:  30        Refills:  0          Substitutions Allowed     Route To [facility] #[id number]   Note from Pharmacy:  **Patient requests 90 days supply**  Signed by Fredo Garza            ------------------------------------------  From: [facility] #[id number]  To: Luis Hines MD  Sent: September 24, 2019 9:27:00 AM CDT  Subject: Medication Management  Due: September 25, 2019 9:27:00 AM CDT    ** On Hold Pending Signature **  Drug: metoprolol (Metoprolol Succinate ER 25 mg oral tablet, extended release)  1 TAB(S) ORAL DAILY,INSTR:NEED APPOINMENT FOR FURTHER REFILLS.  Quantity: 30 tab(s)     Days Supply: 0         Refills: 0  Substitutions Allowed  Notes from Pharmacy: **Patient  requests 90 days supply**    Dispensed Drug: metoprolol (Metoprolol Succinate ER 25 mg oral tablet, extended release)  TAKE 1 TABLET BY MOUTH DAILY  Quantity: 90 tab(s)     Days Supply: 30        Refills: 0  Substitutions Allowed  Notes from Pharmacy: **Patient requests 90 days supply**    ** On Hold Pending Signature **  Drug: hydrochlorothiazide-triamterene (hydrochlorothiazide-triamterene 50 mg-75 mg oral tablet)  1 TAB(S) ORAL DAILY,INSTR:NEED APPOITNMENT FOR FURTHER REFILLS.  Quantity: 30 tab(s)     Days Supply: 0         Refills: 0  Substitutions Allowed  Notes from Pharmacy: **Patient requests 90 days supply**    Dispensed Drug: hydrochlorothiazide-triamterene (hydrochlorothiazide-triamterene 50 mg-75 mg oral tablet)  TAKE 1 TABLET BY MOUTH DAILY  Quantity: 90 tab(s)     Days Supply: 30        Refills: 0  Substitutions Allowed  Notes from Pharmacy: **Patient requests 90 days supply**    ** On Hold Pending Signature **  Drug: lisinopril (lisinopril 2.5 mg oral tablet)  1 TAB(S) ORAL DAILY  Quantity: 30 tab(s)     Days Supply: 0         Refills: 0  Substitutions Allowed  Notes from Pharmacy: **Patient requests 90 days supply**    Dispensed Drug: lisinopril (lisinopril 2.5 mg oral tablet)  TAKE 1 TABLET BY MOUTH DAILY  Quantity: 90 tab(s)     Days Supply: 30        Refills: 0  Substitutions Allowed  Notes from Pharmacy: **Patient requests 90 days supply**  ------------------------------------------

## 2022-02-16 NOTE — PROGRESS NOTES
Patient:   YESSY MACKAY            MRN: [id number]            FIN: [id number]               Age:   67 years     Sex:  Male     :  1950   Associated Diagnoses:   Aortic dissection   Author:   Luis Hines MD      Chief Complaint   2017 8:03 AM CDT    follow up Hospital stay      History of Present Illness   see chief complaint as noted above and confirmed with the patient   67 year old male following up from his recent Hospital stay. He was admitted 10/25  from the [facility] ER to [facility] for aortic dissection that was found on an echo and CT. He needs to see Dr. Fausto Partida and possibly a Vacular surgeon. He states that there has been some confusion about seeing the Surgoen because he was told in the hospital that he may not make it through surgery. but he was scheduled for follow up  He was started on Metoprolol 50mg daily. His Blood Pressure is stable.  He thinks the Metoprolol has caused some diarrhea since he started taking it.       Review of Systems   Constitutional:  No fever, No chills.    Ear/Nose/Mouth/Throat:  No nasal congestion.    Respiratory:  No shortness of breath, No cough, No sputum production.    Cardiovascular:  Negative.    Gastrointestinal:  Diarrhea (A few loose stools per day), No nausea, No vomiting.    Immunologic:  No recurrent infections.    Musculoskeletal:  Negative.    Integumentary:  No rash.    Neurologic:  Negative.    Psychiatric:  Negative.              Health Status   Allergies:    Allergic Reactions (Selected)  Severity Not Documented  Penicillin (High fever)  Vibramycin (High fever)   Medications:  (Selected)   Prescriptions  Prescribed  hydrochlorothiazide-triamterene 50 mg-75 mg oral tablet: 1 tab(s), po, daily, # 90 tab(s), 3 Refill(s), Type: Maintenance, Pharmacy: [facility] #[id number], 1 tab(s) po daily,x90 day(s)  simvastatin 40 mg oral tablet: 1 tab(s) ( 40 mg ), po, hs, # 90 tab(s), 3 Refill(s), Type: Maintenance, Pharmacy: [facility] #[id number], 1 tab(s)  po hs,x90 day(s)  Documented Medications  Documented  Vitamin D with Minerals oral tablet: 1 tab(s), po, daily, 0 Refill(s), Type: Maintenance  aspirin: ( 81 mg ), 0 Refill(s)  ibuprofen: Instructions: as needed, 0 Refill(s), Type: Maintenance  metoprolol succinate 50 mg oral tablet, extended release: 1 tab(s) ( 50 mg ), po, daily, 0 Refill(s), Type: Maintenance   Problem list:    All Problems  TOBACCO USE DISORDER / ICD-9-.1 / Confirmed  Peripheral Vascular Disease / ICD-9-.9 / Confirmed  Hypertension / SNOMED CT 5236882172 / Confirmed  History of transient ischemic attack (TIA) / ICD-9-CM V12.54 / Confirmed  Dyslipidemia / ICD-9-.4 / Confirmed  Back pain / ICD-9-.5 / Confirmed  Amaurosis fugax / SNOMED CT 339213216 / Confirmed  Canceled: thoracic artery dissection      Histories   Past Medical History:    Active  Hypertension (5622371154)  Amaurosis fugax (828489913)  TOBACCO USE DISORDER (305.1)  Peripheral Vascular Disease (443.9)  Dyslipidemia (272.4)  Back pain (724.5)  History of transient ischemic attack (TIA) (V12.54)   Family History:    No family history items have been selected or recorded.   Procedure history:    Echocardiogram ([id number]) on 10/25/2017 at 67 Years.  Comments:  11/1/2017 12:34 PM - Raquel Amezcua MA  ejection fraction of 55-60%.  Dissecting thoracic aneurysm on 11/20/2003 at 53 Years.  Patch angioplasty of artery ([id number]) on 11/20/2003 at 53 Years.   Social History:        Alcohol Assessment            Current      Tobacco Assessment: Current        Physical Examination   Vital Signs   11/2/2017 8:03 AM CDT Peripheral Pulse Rate 72 bpm    Systolic Blood Pressure 128 mmHg    Diastolic Blood Pressure 66 mmHg    Mean Arterial Pressure 87 mmHg      Measurements from flowsheet : Measurements   11/2/2017 8:03 AM CDT Height Measured - Standard 69 in    Weight Measured - Standard 119.8 lb    BSA 1.62 m2    Body Mass Index 17.69 kg/m2      General:  Alert and  oriented, No acute distress.    Eye:  Pupils are equal, round and reactive to light, Normal conjunctiva.    HENT:  Oral mucosa is moist.    Neck:  Supple.    Respiratory:  Lungs are clear to auscultation, Respirations are non-labored.    Cardiovascular:  Normal rate, Regular rhythm, No edema.    Gastrointestinal:  Non-tender, Non-distended.    Musculoskeletal:  Normal gait.    Integumentary:  Warm, No rash.    Psychiatric:  Cooperative, Appropriate mood & affect, Normal judgment.       Review / Management   Results review      Impression and Plan   Diagnosis     Aortic dissection (BZO52-NA I71.00).     Course:  Her sister came into the room. He has been dependent on his sister for assistance and direction for many years. He acknowledges that she would make any decisions for him if he could not. She provides transportation and he often needs advice on more difficult decisions. They were given information about end-of-life decisions healthcare power of  and encouraged to fill out the paperwork and return it.    Plan:  Will send him to lab for chem 8 and cbc.  Will have him still keep appointment with Dr. Fausto Partida. Continue Metoprolol at current dose.  [facility], acted solely as a scribe for, and in the presence of Dr. Luis Hines who performed the service..

## 2022-02-16 NOTE — NURSING NOTE
Comprehensive Intake Entered On:  5/12/2020 9:35 AM CDT    Performed On:  5/12/2020 9:33 AM CDT by Bong LUEVANO, Erika               Summary   Chief Complaint :   verbal consent given for telemed visit.  med check, needs refills on meds.  did see cardio in Nov 2019 and Feb 2020, per cardi metoprolol was increased from 75mg to 100mg.   Systolic Blood Pressure :   107 mmHg   Diastolic Blood Pressure :   66 mmHg   Mean Arterial Pressure :   80 mmHg   Peripheral Pulse Rate :   74 bpm   Erika Woody MA - 5/12/2020 9:33 AM CDT   Health Status   Allergies Verified? :   Yes   Medication History Verified? :   Yes   Immunizations Current :   Yes   Medical History Verified? :   Yes   Pre-Visit Planning Status :   Not completed   Tobacco Use? :   Current every day smoker   Erika Woody MA - 5/12/2020 9:33 AM CDT   Consents   Consent for Immunization Exchange :   Consent Granted   Consent for Immunizations to Providers :   Consent Granted   Erika Woody MA - 5/12/2020 9:33 AM CDT   Meds / Allergies   (As Of: 5/12/2020 9:35:30 AM CDT)   Allergies (Active)   losartan  Estimated Onset Date:   Unspecified ; Reactions:   Wheezing, Shortness of breath ; Created By:   Evelin Farley CMA; Reaction Status:   Active ; Category:   Drug ; Substance:   losartan ; Type:   Allergy ; Updated By:   Evelin Farley CMA; Reviewed Date:   2/27/2019 8:36 AM CST      penicillin  Estimated Onset Date:   Unspecified ; Reactions:   High Fever ; Created By:   Kasie Dumont; Reaction Status:   Active ; Category:   Drug ; Substance:   penicillin ; Type:   Allergy ; Updated By:   Kasie Dumont; Reviewed Date:   1/22/2019 10:31 AM CST      Vibramycin  Estimated Onset Date:   Unspecified ; Reactions:   High Fever ; Created By:   Kasie Dumont; Reaction Status:   Active ; Substance:   Vibramycin ; Updated By:   Kasie Dumont; Reviewed Date:   1/22/2019 10:31 AM CST        Medication List   (As Of: 5/12/2020 9:35:30 AM CDT)    Prescription/Discharge Order    hydrochlorothiazide-triamterene  :   hydrochlorothiazide-triamterene ; Status:   Prescribed ; Ordered As Mnemonic:   hydrochlorothiazide-triamterene 50 mg-75 mg oral tablet ; Simple Display Line:   1 tab(s), Oral, daily, 90 tab(s), 1 Refill(s) ; Ordering Provider:   Luis Hines MD; Catalog Code:   hydrochlorothiazide-triamterene ; Order Dt/Tm:   10/28/2019 12:39:12 PM CDT          lisinopril  :   lisinopril ; Status:   Prescribed ; Ordered As Mnemonic:   lisinopril 2.5 mg oral tablet ; Simple Display Line:   1 tab(s), Oral, daily, 90 tab(s), 0 Refill(s) ; Ordering Provider:   Luis Hines MD; Catalog Code:   lisinopril ; Order Dt/Tm:   5/11/2020 2:50:51 PM CDT          lisinopril 2.5 mg oral tablet  :   lisinopril 2.5 mg oral tablet ; Status:   Completed ; Ordered As Mnemonic:   lisinopril 2.5 mg oral tablet ; Simple Display Line:   1 tab(s), Oral, daily, 82 tab(s) ; Ordering Provider:   Luis Hines MD; Catalog Code:   lisinopril ; Order Dt/Tm:   5/4/2020 11:19:20 AM CDT          metoprolol  :   metoprolol ; Status:   Prescribed ; Ordered As Mnemonic:   Metoprolol Succinate ER 25 mg oral tablet, extended release ; Simple Display Line:   100 mg, 4 tab(s), Oral, daily, 90 tab(s), 1 Refill(s) ; Ordering Provider:   Luis Hines MD; Catalog Code:   metoprolol ; Order Dt/Tm:   10/28/2019 12:40:02 PM CDT          simvastatin  :   simvastatin ; Status:   Prescribed ; Ordered As Mnemonic:   simvastatin 40 mg oral tablet ; Simple Display Line:   1 tab(s), Oral, qhs, 90 tab(s), 1 Refill(s) ; Ordering Provider:   Luis Hines MD; Catalog Code:   simvastatin ; Order Dt/Tm:   10/28/2019 12:40:23 PM CDT            Home Meds    apixaban  :   apixaban ; Status:   Documented ; Ordered As Mnemonic:   Eliquis 2.5 mg oral tablet ; Simple Display Line:   2.5 mg, 1 tab(s), Oral, bid, 0 Refill(s) ; Catalog Code:   apixaban ; Order Dt/Tm:   5/12/2020 9:34:33 AM CDT           aspirin-caffeine  :   aspirin-caffeine ; Status:   Documented ; Ordered As Mnemonic:   [employer] ; Simple Display Line:   2 tab(s), Oral, daily, PRN: as needed for pain, 0 Refill(s) ; Catalog Code:   aspirin-caffeine ; Order Dt/Tm:   10/16/2019 7:57:40 AM CDT          aspirin  :   aspirin ; Status:   Documented ; Ordered As Mnemonic:   aspirin 81 mg oral delayed release tablet ; Simple Display Line:   81 mg, 1 tab(s), PO, Daily, 30 tab(s), 0 Refill(s) ; Catalog Code:   aspirin ; Order Dt/Tm:   10/11/2018 8:14:17 AM CDT          multivitamin with minerals  :   multivitamin with minerals ; Status:   Documented ; Ordered As Mnemonic:   Vitamin D with Minerals oral tablet ; Simple Display Line:   1 tab(s), po, daily ; Catalog Code:   multivitamin with minerals ; Order Dt/Tm:   10/30/2014 8:04:05 AM CDT            ID Risk Screen   Recent Travel History :   No recent travel   Family Member Travel History :   No recent travel   Other Exposure to Infectious Disease :   Unknown   Erika Woody MA - 5/12/2020 9:33 AM CDT   Social History   Social History   (As Of: 5/12/2020 9:35:30 AM CDT)   Alcohol:        Current   (Last Updated: 8/15/2011 11:17:01 AM CDT by Erika Diamond)          Tobacco:  Current      (Last Updated: 8/16/2010 4:02:37 PM CDT by Kayla Vallejo )

## 2022-02-16 NOTE — LETTER
(Inserted Image. Unable to display)       June 17, 2019      YESSY MACKAY  [address]  [place], [place] [id number]          Dear YESSY,      Thank you for selecting [facility] for your healthcare needs.     Our records indicate you are due for the following services:     Non-Fasting Labs    If you had your labs done at another facility or with Direct Access Lab Testing at [facility], please bring in a copy of the results to your next visit, mail a copy, or drop off a copy of your results to your Healthcare Provider.    To schedule an appointment or if you have further questions, please contact your primary clinic:   [facility]       [phone]   [facility]       [phone]              [facility]     [phone]    Powered by [employer]    Sincerely,    Luis Hines MD

## 2022-02-16 NOTE — PROGRESS NOTES
Patient:   YESSY MACKAY            MRN: [id number]            FIN: [id number]               Age:   68 years     Sex:  Male     :  1950   Associated Diagnoses:   Atrial fibrillation with RVR; Elevated TSH; Chronic kidney disease (CKD), stage III (moderate); Dyslipidemia; Hypertension; Peripheral vascular disease; Atrial fibrillation with RVR; Peripheral vascular disease   Author:   Luis Hines MD      Chief Complaint   2019 8:03 AM CST    Medication issues      History of Present Illness   see chief complaint as noted above and confirmed with the patient   68 year old male presenting with medication concerns,   He has been seeing Dr. Mccall , he previously was seeing Dr. Partida at [facility] but his insurance won't let him see them anymore. At his last appointment Dr. Mccall stopped hydrochlorothiazide and decrease metoprolol 50mg to 1/2 tablet daily. He was started on Losartan 25mg daily. Not long after the change he starting coughing, having SOB and wheezing with activity and couldn't walk to the end of his driveway without problems. His blood pressure been unstable. He decided to go back on his Hydrochlorothiazide and his original dose of Metoprolol because he felt he had been doing well on it. He stopped the Losartan. Within a week after restarting he started feeling better. He denies any dizziness/ lightheadedness, chest pain or pressure, or pain in his legs. His activity levels are back to his base line. His Thyroid has been elevated and needs to be rechecked. He is not currently on any medications for this.     The patient is a 68-year-old who recently had his hydrochlorothiazide stopped and placed on losartan and he seemed to have a reaction.  He is not clear if he had some hypotension or kidney distress but he felt very short of breath and weak.  However, he was not seen during this time but he did stop it and upon changing back he says he feels dramatically better again.  Unfortunately, in  reviewing his blood pressures and pulses his blood pressure is not optimally controlled on the present regimen with several readings of 150, although probably 75% of readings in the normal range.  His pulse has also dropped down to 50 and even though he feels well I am worried this may be too low.         Review of Systems   Constitutional:  No fever, No chills, No fatigue.    Ear/Nose/Mouth/Throat:  No sore throat.    Respiratory:  No shortness of breath, No cough.    Cardiovascular:  No chest pain.    Gastrointestinal:  No nausea, No vomiting.    Musculoskeletal:  No back pain.    Integumentary:  No rash.    Neurologic:  No headache.    Psychiatric:  Negative.              Health Status   Allergies:    Allergic Reactions (Selected)  Severity Not Documented  Penicillin (High fever)  Vibramycin (High fever)   Medications:  (Selected)   Prescriptions  Prescribed  simvastatin 40 mg oral tablet: = 1 tab(s) ( 40 mg ), po, hs, # 90 tab(s), 3 Refill(s), Type: Maintenance, Pharmacy: [id number], 1 tab(s) Oral hs,x90 day(s)  Documented Medications  Documented  Vitamin D with Minerals oral tablet: 1 tab(s), po, daily, 0 Refill(s), Type: Maintenance  aspirin 81 mg oral delayed release tablet: = 1 tab(s) ( 81 mg ), PO, Daily, # 30 tab(s), 0 Refill(s), Type: Maintenance  ibuprofen: Instructions: as needed, 0 Refill(s), Type: Maintenance  losartan 25 mg oral tablet: See Instructions, Instructions: 0.5 tab(12.5mg) Oral BID per Dr Mccall 1/22/2019, 0 Refill(s), Type: Maintenance  metoprolol succinate 50 mg oral tablet, extended release: = 1 tab(s) ( 50 mg ), po, daily, 0 Refill(s), Type: Maintenance   Problem list:    All Problems  TOBACCO USE DISORDER / ICD-9-.1 / Confirmed  Peripheral vascular disease / SNOMED CT 9163416790 / Confirmed  Hypertension / SNOMED CT 0995087916 / Confirmed  History of transient ischemic attack (TIA) / SNOMED CT 468448407 / Confirmed  Dyslipidemia / SNOMED CT 0538218583 /  Confirmed  Chronic kidney disease (CKD), stage III (moderate) / SNOMED CT 1053861053 / Confirmed  Back pain / SNOMED CT 792003080 / Confirmed  Atrial fibrillation with RVR / SNOMED CT 8915884524 / Confirmed  Amaurosis fugax / SNOMED CT 369386985 / Confirmed  Resolved: Inpatient stay / SNOMED CT 031602512  Canceled: thoracic artery dissection      Histories   Past Medical History:    Active  Hypertension (2136047926)  Amaurosis fugax (032825758)  TOBACCO USE DISORDER (305.1)  Peripheral vascular disease (6538334211)  Dyslipidemia (6089564164)  Back pain (963410681)  History of transient ischemic attack (TIA) (861053508)  Chronic kidney disease (CKD), stage III (moderate) (8635198871)  Atrial fibrillation with RVR (8890858177)  Resolved  Inpatient stay (670311728): Onset on 10/25/2017 at 67 years.  Resolved on 10/26/2017 at 67 years.  Comments:  11/6/2017 CST 10:40 AM CST - Radha Moran  @[place], [place] - Atrial fibrillation with RVR, aortic dissection   Family History:    No family history items have been selected or recorded.   Procedure history:    Echocardiogram (8689186741) on 10/25/2017 at 67 Years.  Comments:  11/1/2017 12:34 PM CDT - Raquel Amezcua MA  ejection fraction of 55-60%.  Dissecting thoracic aneurysm on 11/20/2003 at 53 Years.  Patch angioplasty of artery (588359035) on 11/20/2003 at 53 Years.   Social History:        Alcohol Assessment            Current      Tobacco Assessment: Current      Physical Examination   Vital Signs   2/27/2019 8:03 AM CST Peripheral Pulse Rate 74 bpm    Systolic Blood Pressure 114 mmHg    Diastolic Blood Pressure 68 mmHg    Mean Arterial Pressure 83 mmHg      Measurements from flowsheet : Measurements   2/27/2019 8:03 AM CST Height Measured - Standard 69 in    Weight Measured - Standard 131.8 lb    BSA 1.7 m2    Body Mass Index 19.46 kg/m2      General:  Alert and oriented, No acute distress.    Eye:  Pupils are equal, round and reactive to  light, Normal conjunctiva.    HENT:  Oral mucosa is moist.    Neck:  Supple.    Respiratory:  Respirations are non-labored.    Cardiovascular:  Normal rate, Regular rhythm, No edema.    Gastrointestinal:  Non-distended.    Musculoskeletal:  Normal gait.    Integumentary:  Warm, No rash.    Psychiatric:  Cooperative, Appropriate mood & affect, Normal judgment.       Review / Management   Results review      Impression and Plan   Diagnosis     Atrial fibrillation with RVR (HYS53-FT I48.91).     Elevated TSH (HSS31-ZH R79.89).     Chronic kidney disease (CKD), stage III (moderate) (UIV64-XR N18.3).     Dyslipidemia (RWM40-TQ E78.5).     Hypertension (PIV56-OQ I10).     Peripheral vascular disease (IYZ49-RG I73.9).     Course:  We have agreed to reduce his metoprolol back to half and try a very small dose of lisinopril.  He is to continue on the hydrochlorothiazide and then we will follow him closely.  He has agreed to be seen if he starts to feel poorly and will recheck regardless in two weeks.    Plan:  Will send him to lab to check chem 8, cbc, tsh, and t4. Did discuss possibility of him needing a TAVR. He can continue his hydrocholorthizide at current dose. Will have him decrease his metopolol to 25mg daily. Will also start him on Lisinopril 2.5mg daily. If he starts having reactions to this he is stop it. Will have him returni n 2 weeks to recheck a Chem 8. Will have referrals look into seeing if he can go back to seeing Dr. Partida or referring him to another cardiologist per his request.   I, Leelee Farley [facility], acted solely as a scribe for, and in the presence of Dr. Luis Hines who performed the service..

## 2022-02-16 NOTE — LETTER
(Inserted Image. Unable to display)     January 04, 2019      YESSY MACKAY  [address]  [place], [place] [id number]          Dear YESSY,      Thank you for selecting [facility] ([facility]) for your healthcare needs.      Our records indicate you are due for the following services:     Clinical Support Staff (CSS)-Only Blood Pressure Check ~ Please stop in anytime to have your blood pressure rechecked. This is a free service and no appointment necessary.     So we can best determine if your medications are effective in lowering your blood pressure, please make sure your blood pressure medicine has been in your system for at least 1-2 hours prior to coming in.  We encourage you to avoid caffeine or other stimulants prior to having your blood pressure checked and come at a time when you are not feeling rushed.     If you check your blood pressure at home, please bring in your blood pressure monitor and home blood pressure readings.  We will check your machine for accuracy and also share your home readings with your Healthcare Provider.       To schedule an appointment or if you have further questions, please contact your primary clinic:   [facility]       [phone]   [facility]       [phone]              [facility]     [phone]      Powered by "[employer]"    Sincerely,    Luis Hines MD

## 2022-02-16 NOTE — LETTER
(Inserted Image. Unable to display)     February 01, 2019      YESSY MACKAY  [address]  [place], [place] [id number]          Dear YESSY,      Thank you for selecting [facility] [place] [place] [facility] for your healthcare needs.      Our records indicate you are due for the following services:     Non-Fasting Labs.    If you had your labs done at another facility or with Direct Access Lab Testing at [facility], please bring in a copy of the results to your next visit, mail a copy, or drop off a copy of your results to your Healthcare Provider.      To schedule an appointment or if you have further questions, please contact your primary clinic:   [facility]       [phone]   [facility]       [phone]              [facility]     [phone]      Powered by [employer]    Sincerely,    Luis Mccall MD

## 2022-02-16 NOTE — NURSING NOTE
Holter Monitor placed on patient per Luis Mccall for a diagnosis of AFib. Patient instructions given for all aspects of monitor operation and handling. Monitor patient education and diary sent with patient. Patient leaves clinic ambulatory. More than 15 minutes spent with patient for education and instruction.Holter discontinued. Diary reviewed. Patient inadvertently pulled leads off before bedtime. Holter downloaded and sent. Forms faxed.

## 2022-02-16 NOTE — PROGRESS NOTES
Patient:   YESSY MACKAY            MRN: [id number]            FIN: [id number]               Age:   68 years     Sex:  Male     :  1950   Associated Diagnoses:   Atrial fibrillation with RVR; Chronic kidney disease (CKD), stage III (moderate); Hypertension; Peripheral vascular disease; TOBACCO USE DISORDER   Author:   Luis Hines MD      Chief Complaint   4/15/2019 4:05 PM CDT    f/u lab work      History of Present Illness   see chief complaint as noted above and confirmed with the patient   68 year old male here to discuss changing his medications due to his recent lab work showing his kidney function was getting worse after being restarted on Lasix. This was originally stopped by Dr. Mccall but he started coughing and having shortness of breath so we restarted him on it.  He states his blood pressure has gotten better and he's feeling much better. He is scheduled to see Dr. Partida on Wed. He is working on quitting smoking. right now he is down to 3 cigarettes daily and plans to stop completely by May. He had been taking ibuprofen for mild back pain but found that he has recently been getting diarrhea so he had to stop it.      Review of Systems   Constitutional:  No fever, No chills, No fatigue.    Ear/Nose/Mouth/Throat:  No sore throat.    Respiratory:  No shortness of breath.    Cardiovascular:  No chest pain.    Gastrointestinal:  No nausea, No vomiting.    Musculoskeletal:  Back pain.    Integumentary:  No rash.    Neurologic:  No headache.    Psychiatric:  Negative.              Health Status   Allergies:    Allergic Reactions (Selected)  Severity Not Documented  Losartan (Wheezing and shortness of breath)  Penicillin (High fever)  Vibramycin (High fever)   Medications:  (Selected)   Prescriptions  Prescribed  hydrochlorothiazide-triamterene 50 mg-75 mg oral tablet: 1 tab(s), Oral, daily, # 90 tab(s), 1 Refill(s), Type: Maintenance, Pharmacy: [id number], Pt has supply at home please  hold until he calls for refills, 1 tab(s) Oral daily  lisinopril 2.5 mg oral tablet: = 1 tab(s) ( 2.5 mg ), Oral, daily, # 30 tab(s), 3 Refill(s), Type: Maintenance, Pharmacy: [id number], New medication, 1 tab(s) Oral daily  metoprolol succinate 25 mg oral capsule, extended release: = 1 cap(s) ( 25 mg ), Oral, daily, # 90 cap(s), 1 Refill(s), Type: Maintenance, Pharmacy: [id number], Pt has supply at home please keep on hold until he calls and requests refills, 1 cap(s) Oral daily  simvastatin 40 mg oral tablet: = 1 tab(s) ( 40 mg ), po, hs, # 90 tab(s), 3 Refill(s), Type: Maintenance, Pharmacy: [id number], 1 tab(s) Oral hs,x90 day(s)  Documented Medications  Documented  Vitamin D with Minerals oral tablet: 1 tab(s), po, daily, 0 Refill(s), Type: Maintenance  aspirin 81 mg oral delayed release tablet: = 1 tab(s) ( 81 mg ), PO, Daily, # 30 tab(s), 0 Refill(s), Type: Maintenance  ibuprofen: Instructions: as needed, 0 Refill(s), Type: Maintenance   Problem list:    All Problems  TOBACCO USE DISORDER / ICD-9-.1 / Confirmed  Peripheral vascular disease / SNOMED CT 6854354259 / Confirmed  Hypertension / SNOMED CT 8440915663 / Confirmed  History of transient ischemic attack (TIA) / SNOMED CT 464578770 / Confirmed  Dyslipidemia / SNOMED CT 6615908015 / Confirmed  Chronic kidney disease (CKD), stage III (moderate) / SNOMED CT 3149632362 / Confirmed  Back pain / SNOMED CT 906252732 / Confirmed  Atrial fibrillation with RVR / SNOMED CT 7791248650 / Confirmed  Amaurosis fugax / SNOMED CT 985150420 / Confirmed  Resolved: Inpatient stay / SNOMED CT 567672297  Canceled: thoracic artery dissection      Histories   Past Medical History:    Active  Hypertension (9974069846)  Amaurosis fugax (363566991)  TOBACCO USE DISORDER (305.1)  Peripheral vascular disease (2904179338)  Dyslipidemia (9851332278)  Back pain (113285033)  History of transient ischemic attack (TIA) (783696201)  Chronic  kidney disease (CKD), stage III (moderate) (6260654241)  Atrial fibrillation with RVR (4239155074)  Resolved  Inpatient stay (718485103): Onset on 10/25/2017 at 67 years.  Resolved on 10/26/2017 at 67 years.  Comments:  11/6/2017 CST 10:40 AM CST - Radha Moran  @[email or web address], [place] - Atrial fibrillation with RVR, aortic dissection   Family History:    No family history items have been selected or recorded.   Procedure history:    Echocardiogram (6547019622) on 10/25/2017 at 67 Years.  Comments:  11/1/2017 12:34 PM CDT - Raquel Amezcua MA  ejection fraction of 55-60%.  Dissecting thoracic aneurysm on 11/20/2003 at 53 Years.  Patch angioplasty of artery (106420802) on 11/20/2003 at 53 Years.   Social History:        Alcohol Assessment            Current      Tobacco Assessment: Current      Physical Examination   Vital Signs   4/15/2019 4:05 PM CDT Peripheral Pulse Rate 64 bpm    Systolic Blood Pressure 122 mmHg    Diastolic Blood Pressure 60 mmHg    Mean Arterial Pressure 81 mmHg      Measurements from flowsheet : Measurements   4/15/2019 4:05 PM CDT Height Measured - Standard 69 in    Weight Measured - Standard 123.6 lb    BSA 1.65 m2    Body Mass Index 18.25 kg/m2  LOW      General:  Alert and oriented, No acute distress.    Eye:  Pupils are equal, round and reactive to light, Normal conjunctiva.    HENT:  Oral mucosa is moist.    Neck:  Supple.    Respiratory:  Respirations are non-labored.    Cardiovascular:  Normal rate, Regular rhythm, No edema.    Gastrointestinal:  Non-distended.    Musculoskeletal:  Normal gait.    Integumentary:  Warm, No rash.    Psychiatric:  Cooperative, Appropriate mood & affect, Normal judgment.       Review / Management   Results review:  Lab results   4/2/2019 9:09 AM CDT Sodium Level 136 mmol/L    Potassium Level 4.0 mmol/L    Chloride Level 109 mmol/L    CO2 Level 18 mmol/L  LOW    Glucose Level 148 mg/dL  HI    BUN 61 mg/dL  HI    Creatinine Level 2.64  mg/dL  HI    BUN/Creat Ratio 23  HI    eGFR 24 mL/min/1.73m2  LOW    eGFR African American 28 mL/min/1.73m2  LOW    Calcium Level 8.9 mg/dL   2/27/2019 8:53 AM CST Sodium Level 140 mmol/L    Potassium Level 4.1 mmol/L    Chloride Level 114 mmol/L  HI    CO2 Level 22 mmol/L    Glucose Level 87 mg/dL    BUN 41 mg/dL  HI    Creatinine Level 2.11 mg/dL  HI    BUN/Creat Ratio 19    eGFR 32 mL/min/1.73m2  LOW    eGFR African American 37 mL/min/1.73m2  LOW    Calcium Level 9.7 mg/dL    T4 Free 1.0 ng/dL    TSH 4.58 mIU/L  HI    WBC 9.2    RBC 4.76    Hgb 14.2 gm/dL    Hct 41.9 %    MCV 88.0 fL    MCH 29.8 pg    MCHC 33.9 gm/dL    RDW 13.7 %    Platelet 216    MPV 10.2 fL   .       Impression and Plan   Diagnosis     Atrial fibrillation with RVR (LET72-AU I48.91).     Chronic kidney disease (CKD), stage III (moderate) (IWL40-FI N18.3).     Hypertension (CIM88-YF I10).     Peripheral vascular disease (VBD05-DH I73.9).     TOBACCO USE DISORDER (IRE01-RD Z72.0).     Plan:  Discussed that his lab work today has gone back up to what they where. Will have him continue his current medications at current doses. Discussed that the ibuprofen is bad for his kidneys so he should take tyelonol for his pain instead. Will recheck a chem 8 in 2 months  Leelee PÉREZ [facility], acted solely as a scribe for, and in the presence of Dr. Luis Hines who performed the service..

## 2022-02-16 NOTE — PROGRESS NOTES
Chief Complaint    verbal consent given for telemed visit.  med check, needs refills on meds.  did see cardio in Nov 2019 and Feb 2020, per cardi metoprolol was increased from 75mg to 100mg.  History of Present Illness      65-year-old with telemedicine visit due to viral pandemic.  Visit occurred from 922 9:45 AM       70-year-old gentleman with a distant history of aortic dissection with emergency repair.  He also has underlying hypertension.  He also has chronic renal failure with a baseline creatinine of 2.2.  He has had recent atrial fibrillation.  And a nuclear stress test suggest severe ischemia in the anterior and inferior wall despite this is not had significant anginal symptoms.  While relatively sedentary he feels comfortable with his present activity ability.       Patient's had discussion with nephrology and cardiology about the significant risk he has of worsening cardiac disease or dissection that needs further repair versus reaction to the dye that would be required.  He seems to understand that there is risk to both ways does not want to proceed with any imaging evaluation at this time.       Patient is supportive his sister otherwise she just really stays at home but he says he is pretty happy he has been taking Eliquis 2.5 mg twice daily along with aspirin.  He is on Dyazide lisinopril metoprolol.  He takes simvastatin 40 mg a day.  He took his blood pressure at home this morning and it was 127/66 with a pulse of 72  Review of Systems      See HPI.  All other review of systems negative.  Physical Exam   Vitals & Measurements    HR: 74(Peripheral)  BP: 107/66       Alert and oriented  Assessment/Plan       1. Atrial fibrillation with RVR (I48.91)         He has been asymptomatic and well rate control.  He is on anticoagulation       2. Chronic kidney disease (CKD), stage III (moderate) (N18.3)         This is stable with most recent labs being done in February showing a GFR of 30 creatinine of  2.17       3. Peripheral vascular disease (I73.9)         No significant claudication he has had TIA symptoms but none for the last 3 years he does have a history of an aortic dissection       4. CAD (coronary artery disease) (I25.10)         Significant disease by nuclear medicine stress test but at this point he is tolerating with him moving his normal life       Orders:         apixaban, = 1 tab(s) ( 2.5 mg ), Oral, bid, # 180 tab(s), 2 Refill(s), Type: Maintenance, Pharmacy: [facility] #[id number], 1 tab(s) Oral bid,x90 day(s), (Ordered)         hydrochlorothiazide-triamterene, 1 tab(s), Oral, daily, # 90 tab(s), 1 Refill(s), Type: Hard Stop, Pharmacy: [facility] #[id number], (Completed)         hydrochlorothiazide-triamterene, 1 tab(s), Oral, daily, # 90 tab(s), 1 Refill(s), Type: Maintenance, Pharmacy: [facility] #[id number], 1 tab(s) Oral daily, (Ordered)         lisinopril, = 1 tab(s), Oral, daily, # 90 tab(s), 1 Refill(s), Type: Soft Stop, Pharmacy: [facility] #[id number], 1 tab(s) Oral daily, (Ordered)         lisinopril, = 1 tab(s), Oral, daily, # 90 tab(s), 0 Refill(s), Type: Hard Stop, Pharmacy: [facility] #[id number], (Completed)         lisinopril, 1 tab(s), Oral, daily, # 82 tab(s), Type: Hard Stop, Pharmacy: [facility] #[id number], (Completed)         metoprolol, = 4 tab(s) ( 100 mg ), Oral, daily, # 90 tab(s), 1 Refill(s), Type: Maintenance, Pharmacy: [facility] #[id number], 4 tab(s) Oral daily, (Ordered)         metoprolol, = 4 tab(s) ( 100 mg ), Oral, daily, # 90 tab(s), 1 Refill(s), Type: Hard Stop, Pharmacy: [facility] #[id number], (Completed)         simvastatin, = 1 tab(s), Oral, qhs, # 90 tab(s), 1 Refill(s), Type: Maintenance, Pharmacy: [facility] #[id number], 1 tab(s) Oral qhs, (Ordered)         simvastatin, = 1 tab(s), Oral, qhs, # 90 tab(s), 1 Refill(s), Type: Hard Stop, Pharmacy: [facility] #[id number], (Completed)  Patient Information      Name:YESSY MACKAY      Address:      [address] [place] [address] [address]      [place], [place] [id number]     Sex:Male     YOB: 1950     [phone]     Emergency Contact:IVONNE ZAMUDIO     MRN:[id number]     FIN:[id number]     Location:[facility]     Date of Service:05/12/2020      Primary Care Physician:       Luis Hines MD, [phone]      Attending Physician:       Luis Hines MD, [phone]  Problem List/Past Medical History    Ongoing     Amaurosis fugax     Atrial fibrillation with RVR     Back pain     Chronic kidney disease (CKD), stage III (moderate)     Dyslipidemia     History of transient ischemic attack (TIA)     Hypertension     Peripheral vascular disease     TOBACCO USE DISORDER    Historical     Inpatient stay       Comments: @[place], [place] - Atrial fibrillation with RVR, aortic dissection  Procedure/Surgical History     Echocardiogram (10/25/2017)      Comments: ejection fraction of 55-60%..     Dissecting thoracic aneurysm (11/20/2003)     Patch angioplasty of artery (11/20/2003)  Medications    aspirin 81 mg oral delayed release tablet, 81 mg= 1 tab(s), Oral, daily    [employer], 2 tab(s), Oral, daily, PRN    Eliquis 2.5 mg oral tablet, 2.5 mg= 1 tab(s), Oral, bid, 2 refills    hydrochlorothiazide-triamterene 50 mg-75 mg oral tablet, 1 tab(s), Oral, daily, 1 refills    lisinopril 2.5 mg oral tablet, 1 tab(s), Oral, daily, 1 refills    Metoprolol Succinate ER 25 mg oral tablet, extended release, 100 mg= 4 tab(s), Oral, daily, 1 refills    simvastatin 40 mg oral tablet, 1 tab(s), Oral, qhs, 1 refills    Vitamin D with Minerals oral tablet, 1 tab(s), Oral, daily  Allergies    Vibramycin (High Fever)    losartan (Wheezing, Shortness of breath)    penicillin (High Fever)  Social History    Smoking Status - 05/12/2020     Current every day smoker     Alcohol      Current, 08/15/2011     Tobacco - Current,  08/16/2010  Immunizations      Vaccine Date Status          pneumococcal (PCV13) 09/26/2017 Given          pneumococcal (PPSV23) 09/19/2016 Given          ZOS, shingles 09/10/2012 Recorded          tetanus/diphth/pertuss (Tdap) adult/adol 08/16/2011 Given          Td 01/01/1999 Recorded

## 2022-02-16 NOTE — TELEPHONE ENCOUNTER
---------------------  From: Tiffanie Martin CMA (Phone Messages Pool ([id number]_[facility]))   To: [facility] Message Pool (32224_[place]);     Sent: 2019 3:36:15 PM CDT  Subject: [facility] call     Phone Message    PCP:   PEGGY      Time of Call:  1524       Person Calling:  Aysha RN at [facility] with Dr. Fausto Partida  Phone number:  [phone]    Returned call at: 1534    Note:   Aysha calling in regards to pt. She did not state what for. I did LM stating PEGGY OOC until  and to call us back if other staff can assist.     Last office visit and reason:  4/15/19 f/u lab work PEGGY---------------------  From: Raquel Amezcua MA ([facility] Message Pool ([id number]_[facility]))   To: Raquel Amezcua MA;     Sent: 2019 4:04:00 PM CDT  Subject: FW: [facility] Cardio callCalled Aysha at # below, she did not answer, I left detailed message with patients name and  wondering what she was calling about for patient, asked she please call back and let us know if there is something we need to do or know about patient, I let her know we are in clinic until 2pm today.     Time of call: 8:26am    Raquel LESTER CMA

## 2022-02-16 NOTE — NURSING NOTE
Vital Signs Entered On:  12/2/2020 9:50 AM CST    Performed On:  12/2/2020 9:50 AM CST by Alize Vigil RN               Vital Signs   Systolic Blood Pressure :   132 mmHg (HI)    Diastolic Blood Pressure :   70 mmHg   Mean Arterial Pressure :   91 mmHg   BP Site :   Right arm   Alize Vigil RN - 12/2/2020 9:50 AM CST

## 2022-02-16 NOTE — NURSING NOTE
Comprehensive Intake Entered On:  4/15/2019 4:09 PM CDT    Performed On:  4/15/2019 4:05 PM CDT by Evelin Farley CMA               Summary   Chief Complaint :   f/u lab work   Weight Measured :   123.6 lb(Converted to: 123 lb 10 oz, 56.06 kg)    Height Measured :   69 in(Converted to: 5 ft 9 in, 175.26 cm)    Body Mass Index :   18.25 kg/m2 (LOW)    Body Surface Area :   1.65 m2   Systolic Blood Pressure :   122 mmHg   Diastolic Blood Pressure :   60 mmHg   Mean Arterial Pressure :   81 mmHg   Peripheral Pulse Rate :   64 bpm   Evelin Farley CMA - 4/15/2019 4:05 PM CDT   Health Status   Allergies Verified? :   Yes   Medication History Verified? :   Yes   Immunizations Current :   Yes   Pre-Visit Planning Status :   Completed   Tobacco Use? :   Current every day smoker   Evelin Farley CMA - 4/15/2019 4:05 PM CDT   Consents   Consent for Immunization Exchange :   Consent Granted   Consent for Immunizations to Providers :   Consent Granted   Evelin Farley CMA - 4/15/2019 4:05 PM CDT   Meds / Allergies   (As Of: 4/15/2019 4:09:08 PM CDT)   Allergies (Active)   losartan  Estimated Onset Date:   Unspecified ; Reactions:   Wheezing, Shortness of breath ; Created By:   Evelin Farley CMA; Reaction Status:   Active ; Category:   Drug ; Substance:   losartan ; Type:   Allergy ; Updated By:   Evelin Farley CMA; Reviewed Date:   2/27/2019 8:36 AM CST      penicillin  Estimated Onset Date:   Unspecified ; Reactions:   High Fever ; Created By:   Kasie Dumont; Reaction Status:   Active ; Category:   Drug ; Substance:   penicillin ; Type:   Allergy ; Updated By:   Kasie Dumont; Reviewed Date:   1/22/2019 10:31 AM CST      Vibramycin  Estimated Onset Date:   Unspecified ; Reactions:   High Fever ; Created By:   Kasei Dumont; Reaction Status:   Active ; Substance:   Vibramycin ; Updated By:   Kasie Dumont; Reviewed Date:   1/22/2019 10:31 AM CST        Medication List   (As Of:  4/15/2019 4:09:08 PM CDT)   Prescription/Discharge Order    hydrochlorothiazide-triamterene  :   hydrochlorothiazide-triamterene ; Status:   Prescribed ; Ordered As Mnemonic:   hydrochlorothiazide-triamterene 50 mg-75 mg oral tablet ; Simple Display Line:   1 tab(s), Oral, daily, 90 tab(s), 1 Refill(s) ; Ordering Provider:   Luis Hines MD; Catalog Code:   hydrochlorothiazide-triamterene ; Order Dt/Tm:   2/27/2019 8:41:45 AM          lisinopril  :   lisinopril ; Status:   Prescribed ; Ordered As Mnemonic:   lisinopril 2.5 mg oral tablet ; Simple Display Line:   2.5 mg, 1 tab(s), Oral, daily, 30 tab(s), 3 Refill(s) ; Ordering Provider:   Luis Hines MD; Catalog Code:   lisinopril ; Order Dt/Tm:   2/27/2019 8:43:57 AM          metoprolol  :   metoprolol ; Status:   Prescribed ; Ordered As Mnemonic:   metoprolol succinate 25 mg oral capsule, extended release ; Simple Display Line:   25 mg, 1 cap(s), Oral, daily, 90 cap(s), 1 Refill(s) ; Ordering Provider:   Luis Hines MD; Catalog Code:   metoprolol ; Order Dt/Tm:   2/27/2019 8:42:41 AM          simvastatin  :   simvastatin ; Status:   Prescribed ; Ordered As Mnemonic:   simvastatin 40 mg oral tablet ; Simple Display Line:   40 mg, 1 tab(s), po, hs, for 90 day(s), 90 tab(s), 3 Refill(s) ; Ordering Provider:   Luis Hines MD; Catalog Code:   simvastatin ; Order Dt/Tm:   10/11/2018 8:54:38 AM            Home Meds    aspirin  :   aspirin ; Status:   Documented ; Ordered As Mnemonic:   aspirin 81 mg oral delayed release tablet ; Simple Display Line:   81 mg, 1 tab(s), PO, Daily, 30 tab(s), 0 Refill(s) ; Catalog Code:   aspirin ; Order Dt/Tm:   10/11/2018 8:14:17 AM          ibuprofen  :   ibuprofen ; Status:   Documented ; Ordered As Mnemonic:   ibuprofen ; Simple Display Line:   as needed, 0 Refill(s) ; Catalog Code:   ibuprofen ; Order Dt/Tm:   9/19/2016 9:10:50 AM          multivitamin with minerals  :   multivitamin with minerals ; Status:    Documented ; Ordered As Mnemonic:   Vitamin D with Minerals oral tablet ; Simple Display Line:   1 tab(s), po, daily ; Catalog Code:   multivitamin with minerals ; Order Dt/Tm:   10/30/2014 8:04:05 AM

## 2022-02-16 NOTE — CARE COORDINATION
Patient:   YESSY MACKAY            MRN: [id number]            FIN: [id number]               Age:   67 years     Sex:  Male     :  1950   Associated Diagnoses:   None   Author:   Betsy Lopez CMA      Sources of Information:  [ ] Patient, family member, or caregiver (Please list):  [ x] Hospital discharge summary  [ ] Hospital fax  [ ] List of recent hospitalizations or ED visits  [ ] Other:     Discharged From: [facility]  Discharge Date: 10/26/17    Diagnosis/Problem: aortic dissection    Medication Changes: [x ] Yes [ ] No   Medication List Updated: [ x] Yes [ ] No    Needs Referral or Lab: [x ] Yes [ ] No   Needs BMP at f/u appt on . Also needs f/u appt with Dr. Cory Partida (cardio) and Dr. Russell at [facility] (phone # to be provided when he comes in for his f/u appt).     Needs Follow-up Appointment:  [ x] Within 7 days of discharge (highly complex visit)  [ ] Within 14 days of discharge (moderately complex visit)    Appointment Made With: PEGGY   Date: 17    Called and spoke to pt. States he is doing well. Discussed new meds, f/u appt with PEGGY, and f/u with speciality. Discussed labs needed when he comes in. No ?'s at this time-advised to call if he needs anything prior to appt. He agreed.

## 2022-02-16 NOTE — LETTER
(Inserted Image. Unable to display)   July 03, 2019        YESSY MACKAY      [address] [place] [phone]        Dear YESSY,    Thank you for choosing [facility] for your healthcare needs. Below you will find the results of your recent test(s) done at our clinic.      Your kidneys are still stressed but stable.  Your other tests are OK      Result Name Current Result Previous Result Reference Range   Sodium Level (mmol/L)  138 7/2/2019  140 4/15/2019 135 - 146   Potassium Level (mmol/L)  4.2 7/2/2019  4.1 4/15/2019 3.5 - 5.3   Chloride Level (mmol/L) ((H)) 114 7/2/2019 ((H)) 113 4/15/2019 98 - 110   CO2 Level (mmol/L) ((L)) 17 7/2/2019 ((L)) 14 4/15/2019 20 - 32   Glucose Level (mg/dL)  87 7/2/2019 ((H)) 103 4/15/2019 65 - 99   BUN (mg/dL) ((H)) 42 7/2/2019 ((H)) 47 4/15/2019 7 - 25   Creatinine Level (mg/dL) ((H)) 2.32 7/2/2019 ((H)) 2.19 4/15/2019 0.70 - 1.25   BUN/Creat Ratio  18 7/2/2019 ((H)) 21 4/15/2019 6 - 22   eGFR (mL/min/1.73m2) ((L)) 28 7/2/2019 ((L)) 24 4/2/2019 > OR = 60 -    eGFR  (mL/min/1.73m2) ((L)) 32 7/2/2019 ((L)) 37 4/15/2019 > OR = 60 -    Calcium Level (mg/dL)  8.9 7/2/2019  9.6 4/15/2019 8.6 - 10.3       Please contact me or my assistant at [phone] if you have any questions or concerns.     Sincerely,        Luis Hines MD        What do your labs mean?  Below is a glossary of commonly ordered labs:  LDL   Bad Cholesterol   HDL   Good Cholesterol  AST/ALT   Liver Function   Cr/Creatinine   Kidney Function  Microalbumin   Kidney Function  BUN   Kidney Function  PSA   Prostate    TSH   Thyroid Hormone  HgbA1c   Diabetes Test   Hgb (Hemoglobin)   Red Blood Cells

## 2022-02-16 NOTE — LETTER
(Inserted Image. Unable to display)                                                                                                [address] [facility] [address]                                                April 10, 2019Re: YESSY MACKAY1950Tolaly Partida [facility]  [address] [place] [id number]: Dr. PartidaThe following patient has been referred to your office/practice:  YESSY THOMPSON Appointment:  4/17/2019 @ 3:10pmLocation:  [place]Please refer to the attached  clinical documentation for a summary of YESSY's care.  Please do not hesitate to contact our office if any additional clinical questions arise.  All relevant records and transition of care documents should be mailed or faxed.Your assistance in providing continuity of care is appreciated. Sincerely, [facility] [address] [place] [address](P) [phone](F) [phone]

## 2022-02-16 NOTE — TELEPHONE ENCOUNTER
Entered by Madiha Norwood on June 05, 2020 3:51:51 PM CDT  ---------------------  From: Madiha Norwood   To: [facility] #[id number]    Sent: 6/5/2020 3:51:51 PM CDT  Subject: Medication Management     ** Not Approved: Patient has requested refill too soon **  metoprolol  TAKE 4 TABLETS BY MOUTH DAILY  Qty:  368 tab(s)        Refills:  0          Substitutions Allowed     Details:  368 tab(s), TAKE 4 TABLETS BY MOUTH DAILY, Route to Pharmacy Electronically [facility] #[id number], 5/12/2020, 5/12/2020, 90, **Patient requests 90 days supply**, Luis Hines MD      Route To Pharmacy - [facility] #[id number]   Note from Pharmacy:  **Patient requests 90 days supply**  Signed by Madiha Norwood            ------------------------------------------  From: [facility] #[id number]  To: Luis Hines MD  Sent: June 5, 2020 10:07:33 AM CDT  Subject: Medication Management  Due: June 5, 2020 4:00:35 PM CDT     ** On Hold Pending Signature **     Dispensed Drug: metoprolol (Metoprolol Succinate ER 25 mg oral tablet, extended release), TAKE 4 TABLETS BY MOUTH DAILY  Quantity: 368 tab(s)  Days Supply: 90  Refills: 0  Substitutions Allowed  Notes from Pharmacy: **Patient requests 90 days supply**  ------------------------------------------

## 2022-02-16 NOTE — LETTER
(Inserted Image. Unable to display)   March 02, 2019        YESSY MACKAY      [address] [place] [phone]        Dear YESSY,    Thank you for choosing [facility] for your healthcare needs. Below you will find the results of your recent test(s) done at our clinic.     overall your lab tests are OK      Result Name Current Result Previous Result Reference Range   Sodium Level (mmol/L)  140 2/27/2019  139 10/11/2018 135 - 146   Potassium Level (mmol/L)  4.1 2/27/2019  3.9 10/11/2018 3.5 - 5.3   Chloride Level (mmol/L) ((H)) 114 2/27/2019  110 10/11/2018 98 - 110   CO2 Level (mmol/L)  22 2/27/2019 ((L)) 18 10/11/2018 20 - 32   Glucose Level (mg/dL)  87 2/27/2019  93 10/11/2018 65 - 99   BUN (mg/dL) ((H)) 41 2/27/2019 ((H)) 41 10/11/2018 7 - 25   Creatinine Level (mg/dL) ((H)) 2.11 2/27/2019 ((H)) 2.06 10/11/2018 0.70 - 1.25   BUN/Creat Ratio  19 2/27/2019  20 10/11/2018 6 - 22   eGFR (mL/min/1.73m2) ((L)) 32 2/27/2019 ((L)) 33 10/11/2018 > OR = 60 -    Calcium Level (mg/dL)  9.7 2/27/2019  9.1 10/11/2018 8.6 - 10.3   T4 Free (ng/dL)  1.0 2/27/2019  0.8 - 1.8   TSH (mIU/L) ((H)) 4.58 2/27/2019 ((H)) 5.19 10/11/2018 0.40 - 4.50   WBC  9.2 2/27/2019 ((H)) 10.9 10/11/2018 3.8 - 10.8   RBC  4.76 2/27/2019  4.57 10/11/2018 4.20 - 5.80   Hgb (gm/dL)  14.2 2/27/2019  14.0 10/11/2018 13.2 - 17.1   Hct (%)  41.9 2/27/2019  41.4 10/11/2018 38.5 - 50.0   MCV (fL)  88.0 2/27/2019  90.6 10/11/2018 80.0 - 100.0   MCH (pg)  29.8 2/27/2019  30.6 10/11/2018 27.0 - 33.0   MCHC (gm/dL)  33.9 2/27/2019  33.8 10/11/2018 32.0 - 36.0   RDW (%)  13.7 2/27/2019  13.2 10/11/2018 11.0 - 15.0   Platelet  216 2/27/2019  186 10/11/2018 140 - 400   MPV (fL)  10.2 2/27/2019  10.3 10/11/2018 7.5 - 12.5       Please contact me or my assistant at [phone] if you have any questions or concerns.     Sincerely,        Luis Hines MD        What do your labs mean?  Below is a glossary of commonly ordered  labs:  LDL   Bad Cholesterol   HDL   Good Cholesterol  AST/ALT   Liver Function   Cr/Creatinine   Kidney Function  Microalbumin   Kidney Function  BUN   Kidney Function  PSA   Prostate    TSH   Thyroid Hormone  HgbA1c   Diabetes Test   Hgb (Hemoglobin)   Red Blood Cells

## 2022-02-16 NOTE — NURSING NOTE
Comprehensive Intake Entered On:  10/16/2019 8:01 AM CDT    Performed On:  10/16/2019 7:58 AM CDT by Trista Laurent CMA               Summary   Chief Complaint :   renal consult per [place] and Dr Fausto Partida - discuss the need for CT w/ dye to further look at thoracic aortic aneyrysm   Weight Measured :   122 lb(Converted to: 122 lb 0 oz, 55.34 kg)    Height Measured :   69 in(Converted to: 5 ft 9 in, 175.26 cm)    Body Mass Index :   18.01 kg/m2 (LOW)    Body Surface Area :   1.64 m2   Systolic Blood Pressure :   130 mmHg   Diastolic Blood Pressure :   70 mmHg   Mean Arterial Pressure :   90 mmHg   Peripheral Pulse Rate :   100 bpm   (Comment: sl irregular [Trista Laurent CMA - 10/16/2019 7:58 AM CDT] )   BP Site :   Right arm   Temperature Tympanic :   97.6 DegF(Converted to: 36.4 DegC)  (LOW)    Trista Laurent CMA - 10/16/2019 7:58 AM CDT   Health Status   Allergies Verified? :   Yes   Medication History Verified? :   Yes   Immunizations Current :   Yes   Medical History Verified? :   Yes   Pre-Visit Planning Status :   Completed   Tobacco Use? :   Current every day smoker   Trista Laurent CMA - 10/16/2019 7:58 AM CDT

## 2022-02-16 NOTE — TELEPHONE ENCOUNTER
Entered by Jacquelin Pace CMA on October 24, 2020 8:05:34 AM CDT  ---------------------  From: Jacquelin Pace CMA   [id number]    Sent: 10/24/2020 8:05:34 AM CDT  Subject: Medication Management     ** Submitted: **  Order:simvastatin (simvastatin 40 mg oral tablet)  1 tab(s)  Oral  qhs  Qty:  30 tab(s)        Refills:  0          Substitutions Allowed     Route To [facility] #[id number]    Signed by Jacquelin Pace CMA  10/24/2020 1:04:00 PM [facility]    ** Submitted: **  Complete:simvastatin (simvastatin 40 mg oral tablet)   Signed by Jacquelin Pace CMA  10/24/2020 1:05:00 PM [facility]    ** Not Approved:  **  simvastatin (SIMVASTATIN 40MG TABLETS)  TAKE 1 TABLET BY MOUTH EVERY NIGHT AT BEDTIME  Qty:  90 tab(s)        Days Supply:  90        Refills:  0          Substitutions Allowed     Route To [facility] #[id number]   Signed by Jacquelin Pace CMA            ** Submitted: **  Order:hydrochlorothiazide-triamterene (hydrochlorothiazide-triamterene 50 mg-75 mg oral tablet)  1 tab(s)  Oral  daily  Qty:  30 tab(s)        Refills:  0          Substitutions Allowed     Route To [facility] #[id number]    Signed by Jacquelin Pace CMA  10/24/2020 1:01:00 PM [facility]    ** Submitted: **  Complete:hydrochlorothiazide-triamterene (hydrochlorothiazide-triamterene 50 mg-75 mg oral tablet)   Signed by Jacquelin Pace CMA  10/24/2020 1:02:00 PM [facility]    ** Not Approved:  **  hydrochlorothiazide-triamterene (TRIAMTERENE 75MG/ HCTZ 50MG TABLETS)  TAKE 1 TABLET BY MOUTH DAILY  Qty:  90 tab(s)        Days Supply:  90        Refills:  0          Substitutions Allowed     Route To [facility] #[id number]   Signed by Jacquelin Pace CMA            ------------------------------------------  From: [facility] #[id number]  To: Luis Hines MD  Sent: October 23, 2020 5:13:01 AM CDT  Subject: Medication Management  Due: October 8, 2020 2:04:31 PM CDT     ** On Hold Pending Signature  **     Dispensed Drug: simvastatin (simvastatin 40 mg oral tablet), TAKE 1 TABLET BY MOUTH EVERY NIGHT AT BEDTIME  Quantity: 90 tab(s)  Days Supply: 90  Refills: 0  Substitutions Allowed  Notes from Pharmacy:     ** On Hold Pending Signature **     Dispensed Drug: hydrochlorothiazide-triamterene (hydrochlorothiazide-triamterene 50 mg-75 mg oral tablet), TAKE 1 TABLET BY MOUTH DAILY  Quantity: 90 tab(s)  Days Supply: 90  Refills: 0  Substitutions Allowed  Notes from Pharmacy:  ------------------------------------------Med Refill      Date of last office visit and reason:  5/12/20; vascular disease      Date of last Med Check / Px:   5/12/20  Date of last labs pertaining to med:  10/2/19    RTC order in chart:  yes; early November    For Protocol refill, has patient been contacted:  msg sent to pharmacy

## 2022-02-16 NOTE — NURSING NOTE
Comprehensive Intake Entered On:  10/2/2019 8:22 AM CDT    Performed On:  10/2/2019 8:19 AM CDT by Evelin Farley CMA               Summary   Chief Complaint :   Med check   Weight Measured :   121.6 lb(Converted to: 121 lb 10 oz, 55.16 kg)    Height Measured :   69 in(Converted to: 5 ft 9 in, 175.26 cm)    Body Mass Index :   17.96 kg/m2 (LOW)    Body Surface Area :   1.64 m2   Systolic Blood Pressure :   122 mmHg   Diastolic Blood Pressure :   64 mmHg   Mean Arterial Pressure :   83 mmHg   Peripheral Pulse Rate :   74 bpm   Evelin Farley CMA - 10/2/2019 8:19 AM CDT   Health Status   Allergies Verified? :   Yes   Medication History Verified? :   Yes   Immunizations Current :   Yes   Pre-Visit Planning Status :   Completed   Tobacco Use? :   Current every day smoker   Evelin Farley CMA - 10/2/2019 8:19 AM CDT   Consents   Consent for Immunization Exchange :   Consent Granted   Consent for Immunizations to Providers :   Consent Granted   Evelin Farley CMA - 10/2/2019 8:19 AM CDT   Meds / Allergies   (As Of: 10/2/2019 8:22:23 AM CDT)   Allergies (Active)   losartan  Estimated Onset Date:   Unspecified ; Reactions:   Wheezing, Shortness of breath ; Created By:   Evelin Farley CMA; Reaction Status:   Active ; Category:   Drug ; Substance:   losartan ; Type:   Allergy ; Updated By:   Evelin Farley CMA; Reviewed Date:   2/27/2019 8:36 AM CST      penicillin  Estimated Onset Date:   Unspecified ; Reactions:   High Fever ; Created By:   Kasie Dumont CMA; Reaction Status:   Active ; Category:   Drug ; Substance:   penicillin ; Type:   Allergy ; Updated By:   Kasie Dumont CMA; Reviewed Date:   1/22/2019 10:31 AM CST      Vibramycin  Estimated Onset Date:   Unspecified ; Reactions:   High Fever ; Created By:   Kasie Dumont CMA; Reaction Status:   Active ; Substance:   Vibramycin ; Updated By:   Kasie Dumont CMA; Reviewed Date:   1/22/2019 10:31 AM CST        Medication List    (As Of: 10/2/2019 8:22:23 AM CDT)   Prescription/Discharge Order    hydrochlorothiazide-triamterene  :   hydrochlorothiazide-triamterene ; Status:   Prescribed ; Ordered As Mnemonic:   hydrochlorothiazide-triamterene 50 mg-75 mg oral tablet ; Simple Display Line:   1 tab(s), Oral, daily, Need appoitnment for further refills., 30 tab(s), 0 Refill(s) ; Ordering Provider:   Luis Hines MD; Catalog Code:   hydrochlorothiazide-triamterene ; Order Dt/Tm:   9/24/2019 9:25:01 AM          lisinopril  :   lisinopril ; Status:   Prescribed ; Ordered As Mnemonic:   lisinopril 2.5 mg oral tablet ; Simple Display Line:   1 tab(s), Oral, daily, 30 tab(s), 0 Refill(s) ; Ordering Provider:   Luis Hines MD; Catalog Code:   lisinopril ; Order Dt/Tm:   9/24/2019 9:26:07 AM          metoprolol  :   metoprolol ; Status:   Prescribed ; Ordered As Mnemonic:   Metoprolol Succinate ER 25 mg oral tablet, extended release ; Simple Display Line:   1 tab(s), Oral, daily, Need appoinment for further refills., 30 tab(s), 0 Refill(s) ; Ordering Provider:   Luis Hines MD; Catalog Code:   metoprolol ; Order Dt/Tm:   9/24/2019 9:24:44 AM          simvastatin  :   simvastatin ; Status:   Prescribed ; Ordered As Mnemonic:   simvastatin 40 mg oral tablet ; Simple Display Line:   1 tab(s), Oral, qhs, 30 tab(s), 0 Refill(s) ; Ordering Provider:   Luis Hines MD; Catalog Code:   simvastatin ; Order Dt/Tm:   9/24/2019 9:24:09 AM            Home Meds    aspirin  :   aspirin ; Status:   Documented ; Ordered As Mnemonic:   aspirin 81 mg oral delayed release tablet ; Simple Display Line:   81 mg, 1 tab(s), PO, Daily, 30 tab(s), 0 Refill(s) ; Catalog Code:   aspirin ; Order Dt/Tm:   10/11/2018 8:14:17 AM          ibuprofen  :   ibuprofen ; Status:   Documented ; Ordered As Mnemonic:   ibuprofen ; Simple Display Line:   as needed, 0 Refill(s) ; Catalog Code:   ibuprofen ; Order Dt/Tm:   9/19/2016 9:10:50 AM          multivitamin with  minerals  :   multivitamin with minerals ; Status:   Documented ; Ordered As Mnemonic:   Vitamin D with Minerals oral tablet ; Simple Display Line:   1 tab(s), po, daily ; Catalog Code:   multivitamin with minerals ; Order Dt/Tm:   10/30/2014 8:04:05 AM

## 2022-02-16 NOTE — PROGRESS NOTES
Patient:   YESSY MACKAY            MRN: [id number]            FIN: [id number]               Age:   69 years     Sex:  Male     :  1950   Associated Diagnoses:   Aortic dissection, thoracic; Atrial fibrillation with RVR; Chronic kidney disease (CKD), stage IV (severe); Peripheral vascular disease   Author:   Fabricio Rodriguez MD      Visit Information      Date of Service: 10/16/2019 07:52 am  Performing Location: [facility]  Encounter#: [id number]      Primary Care Provider (PCP):  Luis Hines MD    NPI# [id number]      Referring Provider:  Fabricio Rodriguez MD    NPI# [id number]      Chief Complaint   10/16/2019 7:58 AM CDT   renal consult per Cachorro and Dr Fausto Partida - discuss the need for CT w/ dye to further look at thoracic aortic aneyrysm              Additional Information:No additional information recorded during visit.   Chief complaint and symptoms as noted above and confirmed with patient.  Recent lab and diagnostic studies reviewed with patient      History of Present Illness   10/16/2019: Mj is referred to me at the request of both Dr. Hines and Dr. Cory Partida regarding his chronic kidney disease particularly in the context of known aortic vascular disease and potential need for future contrast imaging.  He has a history of a previous aortic dissection occurring back in .  He underwent emergent vascular repair involving placement of a dacryon graft though has a residual type B aortic dissection since that time.  He has been evaluated through the [facility] with last imaging evaluation in 2017.  He did meet with a cardiothoracic surgeon at that time and was told that any future intervention would carry exceedingly high risk and he was discouraged from pursuing any surgical care at that time.  Primarily due to declining kidney function is not had any subsequent contrast studies.  Dr. Partida has encouraged him to do so to have a better context of the degree of  his dissection.  He has been very hesitant to do this.  He has had family members with renal complications after previous contrast studies.  He questions whether he would pursue any means of intervention given previous conversations with cardiothoracic surgeons.         Review of Systems   Constitutional:  No fever, No chills.    Eye:  Negative except as documented in history of present illness.    Ear/Nose/Mouth/Throat:  Negative except as documented in history of present illness.    Respiratory:  No shortness of breath.    Cardiovascular:  No chest pain, No palpitations, No peripheral edema, No syncope.    Gastrointestinal:  No nausea, No vomiting, No abdominal pain.    Genitourinary:  No dysuria, No hematuria.    Hematology/Lymphatics:  Negative except as documented in history of present illness.    Endocrine:  No excessive thirst, No polyuria.    Immunologic:  No recurrent fevers.    Musculoskeletal:  No joint pain, No muscle pain.    Neurologic:  Alert and oriented X4, No numbness, No tingling, No headache.       Health Status   Allergies:    Allergic Reactions (Selected)  Severity Not Documented  Losartan (Wheezing and shortness of breath)  Penicillin (High fever)  Vibramycin (High fever)   Medications:  (Selected)   Prescriptions  Prescribed  Metoprolol Succinate ER 25 mg oral tablet, extended release: = 1 tab(s), Oral, daily, Instructions: Need appoinment for further refills., # 30 tab(s), 0 Refill(s), Type: Maintenance, Pharmacy: [facility] #[id number], 1 tab(s) Oral daily,Instr:Need appoinment for further refills.  hydrochlorothiazide-triamterene 50 mg-75 mg oral tablet: 1 tab(s), Oral, daily, Instructions: Need appoitnment for further refills., # 30 tab(s), 0 Refill(s), Type: Maintenance, Pharmacy: [facility] #[id number], 1 tab(s) Oral daily,Instr:Need appoitnment for further refills.  lisinopril 2.5 mg oral tablet: = 1 tab(s), Oral, daily, # 30 tab(s), 0 Refill(s), Type: Maintenance, Pharmacy:  [facility] #[id number], 1 tab(s) Oral daily  simvastatin 40 mg oral tablet: = 1 tab(s), Oral, qhs, # 30 tab(s), 0 Refill(s), Type: Maintenance, Pharmacy: [facility] #[id number], 1 tab(s) Oral qhs  Documented Medications  Documented  Titi Back & Body: 2 tab(s), Oral, daily, PRN: as needed for pain, 0 Refill(s), Type: Maintenance  Vitamin D with Minerals oral tablet: 1 tab(s), po, daily, 0 Refill(s), Type: Maintenance  aspirin 81 mg oral delayed release tablet: = 1 tab(s) ( 81 mg ), PO, Daily, # 30 tab(s), 0 Refill(s), Type: Maintenance,    Medications          *denotes recorded medication          *aspirin 81 mg oral delayed release tablet: 81 mg, 1 tab(s), PO, Daily, 30 tab(s), 0 Refill(s).          *Titi Back & Body: 2 tab(s), Oral, daily, PRN: as needed for pain, 0 Refill(s).          hydrochlorothiazide-triamterene 50 mg-75 mg oral tablet: 1 tab(s), Oral, daily, Need appoitnment for further refills., 30 tab(s), 0 Refill(s).          lisinopril 2.5 mg oral tablet: 1 tab(s), Oral, daily, 30 tab(s), 0 Refill(s).          Metoprolol Succinate ER 25 mg oral tablet, extended release: 1 tab(s), Oral, daily, Need appoinment for further refills., 30 tab(s), 0 Refill(s).          *Vitamin D with Minerals oral tablet: 1 tab(s), po, daily.          simvastatin 40 mg oral tablet: 1 tab(s), Oral, qhs, 30 tab(s), 0 Refill(s).       Problem list:    All Problems  Amaurosis fugax / SNOMED CT 081823745 / Confirmed  Atrial fibrillation with RVR / SNOMED CT 9701122166 / Confirmed  Back pain / SNOMED CT 471716478 / Confirmed  Chronic kidney disease (CKD), stage III (moderate) / SNOMED CT 1183675352 / Confirmed  Dyslipidemia / SNOMED CT 0641994208 / Confirmed  History of transient ischemic attack (TIA) / SNOMED CT 489812928 / Confirmed  Hypertension / SNOMED CT 4990911341 / Confirmed  Peripheral vascular disease / SNOMED CT 8962332003 / Confirmed  TOBACCO USE DISORDER / ICD-9-.1 / Confirmed  Resolved: Inpatient stay  / SNOMED CT 255637396  Canceled: thoracic artery dissection      Histories   Past Medical History:    Active  Hypertension (4271960750)  Amaurosis fugax (624880102)  TOBACCO USE DISORDER (305.1)  Peripheral vascular disease (0277352200)  Dyslipidemia (3944067032)  Back pain (701516399)  History of transient ischemic attack (TIA) (026655811)  Chronic kidney disease (CKD), stage III (moderate) (0128179844)  Atrial fibrillation with RVR (6570456477)  Resolved  Inpatient stay (826561517): Onset on 10/25/2017 at 67 years.  Resolved on 10/26/2017 at 67 years.  Comments:  11/6/2017 CST 10:40 AM CST - Radha Moran  @[place], [place] - Atrial fibrillation with RVR, aortic dissection   Family History:    No family history items have been selected or recorded.   Procedure history:    Echocardiogram (2371409990) on 10/25/2017 at 67 Years.  Comments:  11/1/2017 12:34 PM CDT - Raquel Amezcua MA  ejection fraction of 55-60%.  Dissecting thoracic aneurysm on 11/20/2003 at 53 Years.  Patch angioplasty of artery (534045978) on 11/20/2003 at 53 Years.   Social History:        Alcohol Assessment            Current      Tobacco Assessment: Current        Physical Examination   vital signs stable, as noted above   Vital Signs   10/16/2019 7:58 AM CDT Temperature Tympanic 97.6 DegF  LOW    Peripheral Pulse Rate 100 bpm    Systolic Blood Pressure 130 mmHg    Diastolic Blood Pressure 70 mmHg    Mean Arterial Pressure 90 mmHg    BP Site Right arm      Measurements from flowsheet : Measurements   10/16/2019 7:58 AM CDT Height Measured - Standard 69 in    Weight Measured - Standard 122 lb    BSA 1.64 m2    Body Mass Index 18.01 kg/m2  LOW      General:  Alert and oriented, No acute distress.    Eye:  Extraocular movements are intact.    HENT:  Normocephalic, Oral mucosa is moist.    Neck:  Supple, No carotid bruit, No jugular venous distention, No lymphadenopathy.    Respiratory:  Lungs are clear to auscultation,  Respirations are non-labored.    Cardiovascular:  Normal rate, No murmur, No edema.    Gastrointestinal:  Soft, Non-distended, Normal bowel sounds, No organomegaly.    Musculoskeletal:  Normal range of motion, Normal strength, No tenderness.    Neurologic:  Alert, Oriented, Normal motor function, No focal deficits.    Cognition and Speech:  Oriented, Speech clear and coherent.    Psychiatric:  Appropriate mood & affect.       Review / Management   Results review:  Lab results   10/2/2019 8:57 AM CDT Sodium Level 137 mmol/L    Potassium Level 4.1 mmol/L    Chloride Level 112 mmol/L  HI    CO2 Level 17 mmol/L  LOW    Glucose Level 80 mg/dL    BUN 46 mg/dL  HI    Creatinine 2.22 mg/dL  HI    BUN/Creat Ratio 21    eGFR 30 mL/min/1.73m2  LOW    eGFR African American 34 mL/min/1.73m2  LOW    Calcium Level 9.1 mg/dL    Calcium Level 9.1 mg/dL    PTH Intact 24 pg/mL    Cholesterol 132 mg/dL    Non-HDL 84    HDL 48 mg/dL    Chol/HDL Ratio 2.8    LDL 67    Triglyceride 83 mg/dL    T4 Free 0.9 ng/dL    TSH 4.15 mIU/L    WBC 9.3    RBC 4.61    Hgb 13.2 gm/dL    Hct 40.4 %    MCV 87.6 fL    MCH 28.6 pg    MCHC 32.7 gm/dL    RDW 13.6 %    Platelet 231    MPV 9.6 fL    UA Color YELLOW    UA Appear CLEAR    UA pH 6.0    UA Specific Gravity 1.013    UA Glucose NEGATIVE    UA Bilirubin NEGATIVE    UA Ketones NEGATIVE    UA Blood NEGATIVE    UA Protein NEGATIVE    UA Nitrite NEGATIVE    UA Leuk Est NEGATIVE   .       Impression and Plan   Diagnosis     Aortic dissection, thoracic (ONY37-KN I71.01).     Atrial fibrillation with RVR (IEL66-KF I48.91).     Chronic kidney disease (CKD), stage IV (severe) (IDI00-UQ N18.4).     Peripheral vascular disease (TIJ10-NB I73.9).       .) Chronic kidney disease, stage 3/4, baseline SCr 2.2, eGFR ~30mL/min - well established disease over time  - primary intention of consultation in setting of known CKD and anticipated future contrast study related to known type B aortic dissection.  Last available  imaging dating back to 12/2017 at which point he was seen by cardiothoracic surgeon and told that any future vascular intervention would be an exceedingly high risk surgery.  He has subsequently been evaluated by both Dr. Mccall and most currently Dr. Cory Partida, both of whom have recommended vascular imaging of his aortic anatomy.  He has been rightfully hesitant in pursuing any such imaging due to risk of contrast induced acute kidney injury.      From a renal perspective, I don't think any gandolinium, MRI based imaging would be feasible at his current GFR.  Iodinated contrast CT imaging is possible, though not without risk.  We discussed that elective imaging, with pre-contrast hydration, and temporary cessation of ACEi would help mitigate the risk, though overall risk for contrast induced TERESITA would still be ~10-15%, and even in these settings, the renal insult is usually only transitory in a vast majority of these patients, with only a subsection of these patients experiencing clinically significant TERESITA.  If he were agreeable to CT contrast imaging, I would explore available options through [facility] for any CO2-based contrast protocols for CTA (I am not aware if CO2 contrast imaging is available for these studies).  Further, he would benefit from the lowest available isotonic contrast load.  I would recommend pre-contrast IV hydration of 15cc/kg bolus og 0.9% saline prior to contrast.  Also would advise temporary cessation of both HCTZ/triamterene and lisinopril for 5 days prior to planned imaging.      I further discussion with Mj, we raises doubts that any information gained from contrast imaging would ultimately change his decisions about any surgical interventions.  He understands from previous conversations with cardiothoracic surgery that any intervention would be very risky, and he's not comfortable making that kind of risk.  I think this thinking is perfectly reasonable, and if the case, I  don't think the risks of any contrast induced TERESITA are worth the risk in this case.  I'm happy to facilitate any future renal protective strategies if future contrast based imaging is pursued, though at this time, I don't think Mj is interested.      Professional Services   Counseling Summary:  This was a 45 minute visit with greater than 50% of that time spent counseling the patient.

## 2022-02-16 NOTE — TELEPHONE ENCOUNTER
---------------------  From: Lorraine Gregorio RN   Sent: 1/15/2020 11:30:57 AM CST  Subject: 24 hour holter scheduling     Order received on 1/14/20 from Dr. Cory Partida for pt to have 24 hour holter for dx of PAF and HTN. Called pt at 1125 informing him of this and transferred him to scheduling to set up appointments for placement and removal.

## 2022-02-16 NOTE — LETTER
(Inserted Image. Unable to display)   April 03, 2019        YESSY MACKAY      [address]  [place], [place] [id number]        Dear YESSY,    Thank you for choosing [facility] for your healthcare needs. Below you will find the results of your recent test(s) done at our clinic.      Your kidneys are a little bit worse and we need to adjust some medications.  I hope to see you soon.      Result Name Current Result Previous Result Reference Range   Sodium Level (mmol/L)  136 4/2/2019  140 2/27/2019 135 - 146   Potassium Level (mmol/L)  4.0 4/2/2019  4.1 2/27/2019 3.5 - 5.3   Chloride Level (mmol/L)  109 4/2/2019 ((H)) 114 2/27/2019 98 - 110   CO2 Level (mmol/L) ((L)) 18 4/2/2019  22 2/27/2019 20 - 32   Glucose Level (mg/dL) ((H)) 148 4/2/2019  87 2/27/2019 65 - 99   BUN (mg/dL) ((H)) 61 4/2/2019 ((H)) 41 2/27/2019 7 - 25   Creatinine Level (mg/dL) ((H)) 2.64 4/2/2019 ((H)) 2.11 2/27/2019 0.70 - 1.25   BUN/Creat Ratio ((H)) 23 4/2/2019  19 2/27/2019 6 - 22   eGFR (mL/min/1.73m2) ((L)) 24 4/2/2019 ((L)) 32 2/27/2019 > OR = 60 -    Calcium Level (mg/dL)  8.9 4/2/2019  9.7 2/27/2019 8.6 - 10.3       Please contact me or my assistant at [phone] if you have any questions or concerns.     Sincerely,        Luis Hines MD        What do your labs mean?  Below is a glossary of commonly ordered labs:  LDL   Bad Cholesterol   HDL   Good Cholesterol  AST/ALT   Liver Function   Cr/Creatinine   Kidney Function  Microalbumin   Kidney Function  BUN   Kidney Function  PSA   Prostate    TSH   Thyroid Hormone  HgbA1c   Diabetes Test   Hgb (Hemoglobin)   Red Blood Cells

## 2022-02-16 NOTE — LETTER
(Inserted Image. Unable to display)   December 06, 2020        YESSY MACKAY      [address] [place] [id number]        Dear YESSY,    Thank you for choosing [facility] for your healthcare needs. Below you will find the results of your recent test(s) done at our clinic.      Your kidneys are showing significant wear and tear but are stable.   Your other labs are all OK      Result Name Current Result Previous Result Reference Range   Sodium Level (mmol/L)  138 12/2/2020  137 10/2/2019 135 - 146   Potassium Level (mmol/L)  5.3 12/2/2020  4.1 10/2/2019 3.5 - 5.3   Chloride Level (mmol/L) ((H)) 116 12/2/2020 ((H)) 112 10/2/2019 98 - 110   CO2 Level (mmol/L) ((L)) 15 12/2/2020 ((L)) 17 10/2/2019 20 - 32   Glucose Level (mg/dL)  83 12/2/2020  80 10/2/2019 65 - 99   BUN (mg/dL) ((H)) 54 12/2/2020 ((H)) 46 10/2/2019 7 - 25   Creatinine Level (mg/dL) ((H)) 2.11 12/2/2020 ((H)) 2.22 10/2/2019 0.70 - 1.25   BUN/Creat Ratio ((H)) 26 12/2/2020  21 10/2/2019 6 - 22   eGFR (mL/min/1.73m2) ((L)) 31 12/2/2020 ((L)) 30 10/2/2019 > OR = 60 -    eGFR  (mL/min/1.73m2) ((L)) 36 12/2/2020 ((L)) 34 10/2/2019 > OR = 60 -    Calcium Level (mg/dL)  9.1 12/2/2020  9.1 10/2/2019 8.6 - 10.3   Cholesterol (mg/dL)  130 12/2/2020  132 10/2/2019  - <200   Non-HDL Cholesterol  88 12/2/2020  84 10/2/2019  - <130   HDL (mg/dL)  42 12/2/2020  48 10/2/2019 > OR = 40 -    Cholesterol/HDL Ratio  3.1 12/2/2020  2.8 10/2/2019  - <5.0   LDL  70 12/2/2020  67 10/2/2019    Triglyceride (mg/dL)  96 12/2/2020  83 10/2/2019  - <150   WBC ((H)) 10.9 12/2/2020  9.3 10/2/2019 3.8 - 10.8   RBC ((L)) 4.19 12/2/2020  4.61 10/2/2019 4.20 - 5.80   Hgb (gm/dL) ((L)) 12.7 12/2/2020  13.2 10/2/2019 13.2 - 17.1   Hct (%)  38.9 12/2/2020  40.4 10/2/2019 38.5 - 50.0   MCV (fL)  92.8 12/2/2020  87.6 10/2/2019 80.0 - 100.0   MCH (pg)  30.3 12/2/2020  28.6 10/2/2019 27.0 - 33.0   MCHC (gm/dL)  32.6 12/2/2020  32.7 10/2/2019  Mom calling stating pt has an appt tomorrow but questions if you have any new information for them because if not, then there is no reason for the appt and they will cancel, please advise at above number. 32.0 - 36.0   RDW (%)  13.5 12/2/2020  13.6 10/2/2019 11.0 - 15.0   Platelet  198 12/2/2020  231 10/2/2019 140 - 400   MPV (fL)  10.2 12/2/2020  9.6 10/2/2019 7.5 - 12.5       Please contact me or my assistant at [phone] if you have any questions or concerns.     Sincerely,        Luis Hines MD        What do your labs mean?  Below is a glossary of commonly ordered labs:  LDL   Bad Cholesterol   HDL   Good Cholesterol  AST/ALT   Liver Function   Cr/Creatinine   Kidney Function  Microalbumin   Kidney Function  BUN   Kidney Function  PSA   Prostate    TSH   Thyroid Hormone  HgbA1c   Diabetes Test   Hgb (Hemoglobin)   Red Blood Cells

## 2022-02-16 NOTE — NURSING NOTE
Comprehensive Intake Entered On:  2/27/2019 8:12 AM CST    Performed On:  2/27/2019 8:03 AM CST by Evelin Farley CMA               Summary   Chief Complaint :   Medication issues   Weight Measured :   131.8 lb(Converted to: 131 lb 13 oz, 59.78 kg)    Height Measured :   69 in(Converted to: 5 ft 9 in, 175.26 cm)    Body Mass Index :   19.46 kg/m2   Body Surface Area :   1.7 m2   Systolic Blood Pressure :   114 mmHg   Diastolic Blood Pressure :   68 mmHg   Mean Arterial Pressure :   83 mmHg   Peripheral Pulse Rate :   74 bpm   Evelin Farley CMA - 2/27/2019 8:03 AM CST   Health Status   Allergies Verified? :   Yes   Medication History Verified? :   Yes   Immunizations Current :   Yes   Pre-Visit Planning Status :   Completed   Tobacco Use? :   Current every day smoker   Evelin Farley CMA - 2/27/2019 8:03 AM CST   Consents   Consent for Immunization Exchange :   Consent Granted   Consent for Immunizations to Providers :   Consent Granted   Evelin Farley CMA - 2/27/2019 8:03 AM CST   Problems   (As Of: 2/27/2019 8:12:07 AM CST)   Problems(Active)    Amaurosis fugax (SNOMED CT  :631906278 )  Name of Problem:   Amaurosis fugax ; Recorder:   Kasie Dumont CMA; Confirmation:   Confirmed ; Classification:   Medical ; Code:   [id number] ; Contributor System:   [employer] ; Last Updated:   9/11/2014 3:27 PM CDT ; Life Cycle Date:   1/18/2010 ; Life Cycle Status:   Active ; Vocabulary:   SNOMED CT        Atrial fibrillation with RVR (SNOMED CT  :7685867130 )  Name of Problem:   Atrial fibrillation with RVR ; Recorder:   Radha Moran; Confirmation:   Confirmed ; Classification:   Medical ; Code:   [id number] ; Contributor System:   [employer] ; Last Updated:   11/6/2017 10:41 AM CST ; Life Cycle Date:   11/6/2017 ; Life Cycle Status:   Active ; Vocabulary:   SNOMED CT        Back pain (SNOMED CT  :695930568 )  Name of Problem:   Back pain ; Recorder:   Erika Diamond; Confirmation:   Confirmed ;  Classification:   Medical ; Code:   [id number] ; Contributor System:   [employer] ; Last Updated:   11/6/2017 10:38 AM CST ; Life Cycle Date:   8/15/2011 ; Life Cycle Status:   Active ; Vocabulary:   SNOMED CT        Chronic kidney disease (CKD), stage III (moderate) (SNOMED CT  :5074463224 )  Name of Problem:   Chronic kidney disease (CKD), stage III (moderate) ; Recorder:   Radha Moran; Confirmation:   Confirmed ; Classification:   Medical ; Code:   [id number] ; Contributor System:   [employer] ; Last Updated:   11/6/2017 10:41 AM CST ; Life Cycle Date:   11/6/2017 ; Life Cycle Status:   Active ; Vocabulary:   SNOMED CT        Dyslipidemia (SNOMED CT  :0104444726 )  Name of Problem:   Dyslipidemia ; Recorder:   Kasie Dumont CMA; Confirmation:   Confirmed ; Classification:   Medical ; Code:   [id number] ; Contributor System:   [employer] ; Last Updated:   11/6/2017 10:39 AM CST ; Life Cycle Date:   1/18/2010 ; Life Cycle Status:   Active ; Vocabulary:   SNOMED CT        History of transient ischemic attack (TIA) (SNOMED CT  :362474608 )  Name of Problem:   History of transient ischemic attack (TIA) ; Recorder:   Erika Diamond; Confirmation:   Confirmed ; Classification:   Medical ; Code:   [id number] ; Contributor System:   [employer] ; Last Updated:   11/6/2017 10:39 AM CST ; Life Cycle Date:   8/15/2011 ; Life Cycle Status:   Active ; Vocabulary:   SNOMED CT        Hypertension (SNOMED CT  :8021688051 )  Name of Problem:   Hypertension ; Recorder:   Kasie Dumont CMA; Confirmation:   Confirmed ; Classification:   Medical ; Code:   [id number] ; Contributor System:   [employer] ; Last Updated:   9/11/2014 3:27 PM CDT ; Life Cycle Date:   1/18/2010 ; Life Cycle Status:   Active ; Vocabulary:   SNOMED CT        Peripheral vascular disease (SNOMED CT  :7973268717 )  Name of Problem:   Peripheral vascular disease ; Recorder:   Kasie Dumont CMA; Confirmation:   Confirmed ; Classification:   Medical ; Code:    [id number] ; Contributor System:   [employer] ; Last Updated:   11/6/2017 10:39 AM CST ; Life Cycle Date:   1/18/2010 ; Life Cycle Status:   Active ; Vocabulary:   SNOMED CT        TOBACCO USE DISORDER (ICD-9-CM  :305.1 )  Name of Problem:   TOBACCO USE DISORDER ; Recorder:   Kasie Dumont CMA; Confirmation:   Confirmed ; Classification:   Medical ; Code:   305.1 ; Contributor System:   [employer] ; Last Updated:   2/28/2014 7:21 PM CST ; Life Cycle Date:   1/18/2010 ; Life Cycle Status:   Active ; Vocabulary:   ICD-9-CM          Meds / Allergies   (As Of: 2/27/2019 8:12:07 AM CST)   Allergies (Active)   penicillin  Estimated Onset Date:   Unspecified ; Reactions:   High Fever ; Created By:   Kasie Dumont; Reaction Status:   Active ; Category:   Drug ; Substance:   penicillin ; Type:   Allergy ; Updated By:   Kasie Dumont; Reviewed Date:   1/22/2019 10:31 AM CST      Vibramycin  Estimated Onset Date:   Unspecified ; Reactions:   High Fever ; Created By:   Kasie Dumont; Reaction Status:   Active ; Substance:   Vibramycin ; Updated By:   Kasie Dumont; Reviewed Date:   1/22/2019 10:31 AM CST        Medication List   (As Of: 2/27/2019 8:12:07 AM CST)   Prescription/Discharge Order    simvastatin  :   simvastatin ; Status:   Prescribed ; Ordered As Mnemonic:   simvastatin 40 mg oral tablet ; Simple Display Line:   40 mg, 1 tab(s), po, hs, for 90 day(s), 90 tab(s), 3 Refill(s) ; Ordering Provider:   Luis Hines MD; Catalog Code:   simvastatin ; Order Dt/Tm:   10/11/2018 8:54:38 AM            Home Meds    aspirin  :   aspirin ; Status:   Documented ; Ordered As Mnemonic:   aspirin 81 mg oral delayed release tablet ; Simple Display Line:   81 mg, 1 tab(s), PO, Daily, 30 tab(s), 0 Refill(s) ; Catalog Code:   aspirin ; Order Dt/Tm:   10/11/2018 8:14:17 AM          ibuprofen  :   ibuprofen ; Status:   Documented ; Ordered As Mnemonic:   ibuprofen ; Simple Display Line:   as needed, 0 Refill(s) ; Catalog  Code:   ibuprofen ; Order Dt/Tm:   9/19/2016 9:10:50 AM          losartan  :   losartan ; Status:   Documented ; Ordered As Mnemonic:   losartan 25 mg oral tablet ; Simple Display Line:   See Instructions, 0.5 tab(12.5mg) Oral BID per Dr Mccall 1/22/2019, 0 Refill(s) ; Catalog Code:   losartan ; Order Dt/Tm:   1/24/2019 10:34:07 AM          metoprolol  :   metoprolol ; Status:   Documented ; Ordered As Mnemonic:   metoprolol succinate 50 mg oral tablet, extended release ; Simple Display Line:   0.5, po, daily, 0 Refill(s) ; Catalog Code:   metoprolol ; Order Dt/Tm:   10/31/2017 2:11:09 PM          multivitamin with minerals  :   multivitamin with minerals ; Status:   Documented ; Ordered As Mnemonic:   Vitamin D with Minerals oral tablet ; Simple Display Line:   1 tab(s), po, daily ; Catalog Code:   multivitamin with minerals ; Order Dt/Tm:   10/30/2014 8:04:05 AM
